# Patient Record
Sex: MALE | NOT HISPANIC OR LATINO | Employment: UNEMPLOYED | URBAN - METROPOLITAN AREA
[De-identification: names, ages, dates, MRNs, and addresses within clinical notes are randomized per-mention and may not be internally consistent; named-entity substitution may affect disease eponyms.]

---

## 2017-05-18 ENCOUNTER — APPOINTMENT (OUTPATIENT)
Dept: AUDIOLOGY | Age: 52
End: 2017-05-18
Payer: MEDICARE

## 2017-05-18 PROCEDURE — 92567 TYMPANOMETRY: CPT | Performed by: AUDIOLOGIST

## 2017-06-15 ENCOUNTER — APPOINTMENT (OUTPATIENT)
Dept: AUDIOLOGY | Age: 52
End: 2017-06-15
Payer: MEDICARE

## 2017-06-15 PROCEDURE — 92567 TYMPANOMETRY: CPT | Performed by: AUDIOLOGIST

## 2017-08-07 ENCOUNTER — APPOINTMENT (OUTPATIENT)
Dept: AUDIOLOGY | Age: 52
End: 2017-08-07
Payer: MEDICARE

## 2017-08-07 PROCEDURE — 92557 COMPREHENSIVE HEARING TEST: CPT | Performed by: AUDIOLOGIST

## 2017-08-07 PROCEDURE — 92567 TYMPANOMETRY: CPT | Performed by: AUDIOLOGIST

## 2025-03-13 ENCOUNTER — APPOINTMENT (INPATIENT)
Dept: RADIOLOGY | Facility: HOSPITAL | Age: 60
DRG: 299 | End: 2025-03-13
Payer: MEDICARE

## 2025-03-13 ENCOUNTER — APPOINTMENT (EMERGENCY)
Dept: RADIOLOGY | Facility: HOSPITAL | Age: 60
DRG: 299 | End: 2025-03-13
Payer: MEDICARE

## 2025-03-13 ENCOUNTER — APPOINTMENT (INPATIENT)
Dept: NON INVASIVE DIAGNOSTICS | Facility: HOSPITAL | Age: 60
DRG: 299 | End: 2025-03-13
Payer: MEDICARE

## 2025-03-13 ENCOUNTER — HOSPITAL ENCOUNTER (INPATIENT)
Facility: HOSPITAL | Age: 60
LOS: 6 days | Discharge: HOME | DRG: 299 | End: 2025-03-19
Attending: EMERGENCY MEDICINE | Admitting: INTERNAL MEDICINE
Payer: MEDICARE

## 2025-03-13 DIAGNOSIS — E27.40 ADRENAL INSUFFICIENCY (HCC): ICD-10-CM

## 2025-03-13 DIAGNOSIS — L98.429 SKIN ULCER OF SACRUM, UNSPECIFIED ULCER STAGE (HCC): ICD-10-CM

## 2025-03-13 DIAGNOSIS — I82.90 ACUTE VENOUS THROMBOSIS: Primary | ICD-10-CM

## 2025-03-13 DIAGNOSIS — E87.6 HYPOKALEMIA: ICD-10-CM

## 2025-03-13 DIAGNOSIS — R41.82 ALTERED MENTAL STATUS: ICD-10-CM

## 2025-03-13 DIAGNOSIS — I95.9 HYPOTENSION: ICD-10-CM

## 2025-03-13 PROBLEM — E03.9 HYPOTHYROIDISM: Status: ACTIVE | Noted: 2025-03-13

## 2025-03-13 PROBLEM — Q90.9 DOWN SYNDROME: Status: ACTIVE | Noted: 2025-03-13

## 2025-03-13 PROBLEM — F41.9 ANXIETY: Status: ACTIVE | Noted: 2025-03-13

## 2025-03-13 LAB
ALBUMIN SERPL BCG-MCNC: 2.6 G/DL (ref 3.5–5)
ALP SERPL-CCNC: 67 U/L (ref 34–104)
ALT SERPL W P-5'-P-CCNC: 35 U/L (ref 7–52)
AMORPH URATE CRY URNS QL MICRO: NORMAL
ANION GAP SERPL CALCULATED.3IONS-SCNC: 9 MMOL/L (ref 4–13)
APTT PPP: 27 SECONDS (ref 23–34)
APTT PPP: 96 SECONDS (ref 23–34)
AST SERPL W P-5'-P-CCNC: 25 U/L (ref 13–39)
ATRIAL RATE: 84 BPM
BACTERIA UR QL AUTO: NORMAL /HPF
BASOPHILS # BLD AUTO: 0.03 THOUSANDS/ÂΜL (ref 0–0.1)
BASOPHILS NFR BLD AUTO: 1 % (ref 0–1)
BILIRUB SERPL-MCNC: 0.55 MG/DL (ref 0.2–1)
BILIRUB UR QL STRIP: NEGATIVE
BUN SERPL-MCNC: 10 MG/DL (ref 5–25)
CALCIUM ALBUM COR SERPL-MCNC: 9.5 MG/DL (ref 8.3–10.1)
CALCIUM SERPL-MCNC: 8.4 MG/DL (ref 8.4–10.2)
CHLORIDE SERPL-SCNC: 103 MMOL/L (ref 96–108)
CLARITY UR: CLEAR
CO2 SERPL-SCNC: 30 MMOL/L (ref 21–32)
COLOR UR: ABNORMAL
CREAT SERPL-MCNC: 0.64 MG/DL (ref 0.6–1.3)
EOSINOPHIL # BLD AUTO: 0.05 THOUSAND/ÂΜL (ref 0–0.61)
EOSINOPHIL NFR BLD AUTO: 1 % (ref 0–6)
ERYTHROCYTE [DISTWIDTH] IN BLOOD BY AUTOMATED COUNT: 19.5 % (ref 11.6–15.1)
GFR SERPL CREATININE-BSD FRML MDRD: 105 ML/MIN/1.73SQ M
GLUCOSE SERPL-MCNC: 99 MG/DL (ref 65–140)
GLUCOSE UR STRIP-MCNC: NEGATIVE MG/DL
HCT VFR BLD AUTO: 37.4 % (ref 36.5–49.3)
HGB BLD-MCNC: 11.7 G/DL (ref 12–17)
HGB UR QL STRIP.AUTO: NEGATIVE
IMM GRANULOCYTES # BLD AUTO: 0.04 THOUSAND/UL (ref 0–0.2)
IMM GRANULOCYTES NFR BLD AUTO: 1 % (ref 0–2)
INR PPP: 1.09 (ref 0.85–1.19)
KETONES UR STRIP-MCNC: NEGATIVE MG/DL
LACTATE SERPL-SCNC: 0.9 MMOL/L (ref 0.5–2)
LACTATE SERPL-SCNC: 2.3 MMOL/L (ref 0.5–2)
LEUKOCYTE ESTERASE UR QL STRIP: NEGATIVE
LYMPHOCYTES # BLD AUTO: 1.55 THOUSANDS/ÂΜL (ref 0.6–4.47)
LYMPHOCYTES NFR BLD AUTO: 31 % (ref 14–44)
MCH RBC QN AUTO: 31 PG (ref 26.8–34.3)
MCHC RBC AUTO-ENTMCNC: 31.3 G/DL (ref 31.4–37.4)
MCV RBC AUTO: 99 FL (ref 82–98)
MONOCYTES # BLD AUTO: 0.64 THOUSAND/ÂΜL (ref 0.17–1.22)
MONOCYTES NFR BLD AUTO: 13 % (ref 4–12)
MUCOUS THREADS UR QL AUTO: NORMAL
NEUTROPHILS # BLD AUTO: 2.7 THOUSANDS/ÂΜL (ref 1.85–7.62)
NEUTS SEG NFR BLD AUTO: 53 % (ref 43–75)
NITRITE UR QL STRIP: NEGATIVE
NON-SQ EPI CELLS URNS QL MICRO: NORMAL /HPF
NRBC BLD AUTO-RTO: 0 /100 WBCS
P AXIS: 59 DEGREES
PH UR STRIP.AUTO: 7 [PH]
PLATELET # BLD AUTO: 330 THOUSANDS/UL (ref 149–390)
PMV BLD AUTO: 10.1 FL (ref 8.9–12.7)
POTASSIUM SERPL-SCNC: 3.5 MMOL/L (ref 3.5–5.3)
PR INTERVAL: 128 MS
PROCALCITONIN SERPL-MCNC: 0.16 NG/ML
PROT SERPL-MCNC: 7 G/DL (ref 6.4–8.4)
PROT UR STRIP-MCNC: ABNORMAL MG/DL
PROTHROMBIN TIME: 14.4 SECONDS (ref 12.3–15)
QRS AXIS: 74 DEGREES
QRSD INTERVAL: 90 MS
QT INTERVAL: 354 MS
QTC INTERVAL: 418 MS
RBC # BLD AUTO: 3.78 MILLION/UL (ref 3.88–5.62)
RBC #/AREA URNS AUTO: NORMAL /HPF
SODIUM SERPL-SCNC: 142 MMOL/L (ref 135–147)
SP GR UR STRIP.AUTO: 1.01 (ref 1–1.03)
T WAVE AXIS: 9 DEGREES
T4 FREE SERPL-MCNC: 1.33 NG/DL (ref 0.61–1.12)
TSH SERPL DL<=0.05 MIU/L-ACNC: 5.66 UIU/ML (ref 0.45–4.5)
UROBILINOGEN UR STRIP-ACNC: <2 MG/DL
VENTRICULAR RATE: 84 BPM
WBC # BLD AUTO: 5.01 THOUSAND/UL (ref 4.31–10.16)
WBC #/AREA URNS AUTO: NORMAL /HPF

## 2025-03-13 PROCEDURE — 70450 CT HEAD/BRAIN W/O DYE: CPT

## 2025-03-13 PROCEDURE — 93005 ELECTROCARDIOGRAM TRACING: CPT

## 2025-03-13 PROCEDURE — 84145 PROCALCITONIN (PCT): CPT

## 2025-03-13 PROCEDURE — 96375 TX/PRO/DX INJ NEW DRUG ADDON: CPT

## 2025-03-13 PROCEDURE — 81001 URINALYSIS AUTO W/SCOPE: CPT

## 2025-03-13 PROCEDURE — 96366 THER/PROPH/DIAG IV INF ADDON: CPT

## 2025-03-13 PROCEDURE — 71260 CT THORAX DX C+: CPT

## 2025-03-13 PROCEDURE — 96374 THER/PROPH/DIAG INJ IV PUSH: CPT

## 2025-03-13 PROCEDURE — 84439 ASSAY OF FREE THYROXINE: CPT

## 2025-03-13 PROCEDURE — 99223 1ST HOSP IP/OBS HIGH 75: CPT | Performed by: INTERNAL MEDICINE

## 2025-03-13 PROCEDURE — 96365 THER/PROPH/DIAG IV INF INIT: CPT

## 2025-03-13 PROCEDURE — 85025 COMPLETE CBC W/AUTO DIFF WBC: CPT

## 2025-03-13 PROCEDURE — 93010 ELECTROCARDIOGRAM REPORT: CPT | Performed by: INTERNAL MEDICINE

## 2025-03-13 PROCEDURE — 93970 EXTREMITY STUDY: CPT | Performed by: SURGERY

## 2025-03-13 PROCEDURE — 87040 BLOOD CULTURE FOR BACTERIA: CPT

## 2025-03-13 PROCEDURE — 96368 THER/DIAG CONCURRENT INF: CPT

## 2025-03-13 PROCEDURE — 93970 EXTREMITY STUDY: CPT

## 2025-03-13 PROCEDURE — 99449 NTRPROF PH1/NTRNET/EHR 31/>: CPT | Performed by: NURSE PRACTITIONER

## 2025-03-13 PROCEDURE — 83605 ASSAY OF LACTIC ACID: CPT

## 2025-03-13 PROCEDURE — 84443 ASSAY THYROID STIM HORMONE: CPT

## 2025-03-13 PROCEDURE — 85610 PROTHROMBIN TIME: CPT

## 2025-03-13 PROCEDURE — 96367 TX/PROPH/DG ADDL SEQ IV INF: CPT

## 2025-03-13 PROCEDURE — 36415 COLL VENOUS BLD VENIPUNCTURE: CPT

## 2025-03-13 PROCEDURE — 74177 CT ABD & PELVIS W/CONTRAST: CPT

## 2025-03-13 PROCEDURE — 71275 CT ANGIOGRAPHY CHEST: CPT

## 2025-03-13 PROCEDURE — 99291 CRITICAL CARE FIRST HOUR: CPT | Performed by: EMERGENCY MEDICINE

## 2025-03-13 PROCEDURE — 85730 THROMBOPLASTIN TIME PARTIAL: CPT

## 2025-03-13 PROCEDURE — 71045 X-RAY EXAM CHEST 1 VIEW: CPT

## 2025-03-13 PROCEDURE — 99285 EMERGENCY DEPT VISIT HI MDM: CPT

## 2025-03-13 PROCEDURE — 80053 COMPREHEN METABOLIC PANEL: CPT

## 2025-03-13 PROCEDURE — 96361 HYDRATE IV INFUSION ADD-ON: CPT

## 2025-03-13 RX ORDER — LEVOTHYROXINE SODIUM 88 UG/1
88 TABLET ORAL
Status: DISCONTINUED | OUTPATIENT
Start: 2025-03-14 | End: 2025-03-14

## 2025-03-13 RX ORDER — HEPARIN SODIUM 10000 [USP'U]/100ML
3-30 INJECTION, SOLUTION INTRAVENOUS
Status: DISCONTINUED | OUTPATIENT
Start: 2025-03-13 | End: 2025-03-15

## 2025-03-13 RX ORDER — MAGNESIUM HYDROXIDE/ALUMINUM HYDROXICE/SIMETHICONE 120; 1200; 1200 MG/30ML; MG/30ML; MG/30ML
30 SUSPENSION ORAL EVERY 6 HOURS PRN
Status: DISCONTINUED | OUTPATIENT
Start: 2025-03-13 | End: 2025-03-19 | Stop reason: HOSPADM

## 2025-03-13 RX ORDER — POLYETHYLENE GLYCOL 3350 17 G/17G
17 POWDER, FOR SOLUTION ORAL DAILY
Status: DISCONTINUED | OUTPATIENT
Start: 2025-03-13 | End: 2025-03-19 | Stop reason: HOSPADM

## 2025-03-13 RX ORDER — BENZOCAINE/MENTHOL 6 MG-10 MG
LOZENGE MUCOUS MEMBRANE 2 TIMES DAILY
Status: DISCONTINUED | OUTPATIENT
Start: 2025-03-13 | End: 2025-03-19 | Stop reason: HOSPADM

## 2025-03-13 RX ORDER — SODIUM CHLORIDE, SODIUM GLUCONATE, SODIUM ACETATE, POTASSIUM CHLORIDE, MAGNESIUM CHLORIDE, SODIUM PHOSPHATE, DIBASIC, AND POTASSIUM PHOSPHATE .53; .5; .37; .037; .03; .012; .00082 G/100ML; G/100ML; G/100ML; G/100ML; G/100ML; G/100ML; G/100ML
1000 INJECTION, SOLUTION INTRAVENOUS ONCE
Status: COMPLETED | OUTPATIENT
Start: 2025-03-13 | End: 2025-03-13

## 2025-03-13 RX ORDER — SODIUM CHLORIDE 9 MG/ML
100 INJECTION, SOLUTION INTRAVENOUS CONTINUOUS
Status: DISPENSED | OUTPATIENT
Start: 2025-03-13 | End: 2025-03-14

## 2025-03-13 RX ORDER — GUAIFENESIN 200 MG/10ML
LIQUID ORAL 3 TIMES DAILY PRN
Status: DISCONTINUED | OUTPATIENT
Start: 2025-03-13 | End: 2025-03-19 | Stop reason: HOSPADM

## 2025-03-13 RX ORDER — ATORVASTATIN CALCIUM 20 MG/1
20 TABLET, FILM COATED ORAL DAILY
Status: DISCONTINUED | OUTPATIENT
Start: 2025-03-13 | End: 2025-03-19 | Stop reason: HOSPADM

## 2025-03-13 RX ORDER — FAMOTIDINE 20 MG/1
20 TABLET, FILM COATED ORAL 2 TIMES DAILY
Status: DISCONTINUED | OUTPATIENT
Start: 2025-03-13 | End: 2025-03-19 | Stop reason: HOSPADM

## 2025-03-13 RX ORDER — HYDROCORTISONE SODIUM SUCCINATE 100 MG/2ML
50 INJECTION INTRAMUSCULAR; INTRAVENOUS EVERY 6 HOURS
Status: DISCONTINUED | OUTPATIENT
Start: 2025-03-13 | End: 2025-03-15

## 2025-03-13 RX ORDER — ACETAMINOPHEN 325 MG/1
650 TABLET ORAL EVERY 6 HOURS PRN
Status: DISCONTINUED | OUTPATIENT
Start: 2025-03-13 | End: 2025-03-19 | Stop reason: HOSPADM

## 2025-03-13 RX ORDER — ONDANSETRON 2 MG/ML
4 INJECTION INTRAMUSCULAR; INTRAVENOUS EVERY 6 HOURS PRN
Status: DISCONTINUED | OUTPATIENT
Start: 2025-03-13 | End: 2025-03-19 | Stop reason: HOSPADM

## 2025-03-13 RX ORDER — PANTOPRAZOLE SODIUM 20 MG/1
20 TABLET, DELAYED RELEASE ORAL
Status: DISCONTINUED | OUTPATIENT
Start: 2025-03-14 | End: 2025-03-19 | Stop reason: HOSPADM

## 2025-03-13 RX ORDER — MIRTAZAPINE 15 MG/1
15 TABLET, FILM COATED ORAL
Status: DISCONTINUED | OUTPATIENT
Start: 2025-03-13 | End: 2025-03-19 | Stop reason: HOSPADM

## 2025-03-13 RX ORDER — HEPARIN SODIUM 1000 [USP'U]/ML
5200 INJECTION, SOLUTION INTRAVENOUS; SUBCUTANEOUS EVERY 6 HOURS PRN
Status: DISCONTINUED | OUTPATIENT
Start: 2025-03-13 | End: 2025-03-15

## 2025-03-13 RX ORDER — HYDROCORTISONE SODIUM SUCCINATE 100 MG/2ML
100 INJECTION INTRAMUSCULAR; INTRAVENOUS ONCE
Status: COMPLETED | OUTPATIENT
Start: 2025-03-13 | End: 2025-03-13

## 2025-03-13 RX ORDER — HEPARIN SODIUM 1000 [USP'U]/ML
2600 INJECTION, SOLUTION INTRAVENOUS; SUBCUTANEOUS EVERY 6 HOURS PRN
Status: DISCONTINUED | OUTPATIENT
Start: 2025-03-13 | End: 2025-03-15

## 2025-03-13 RX ORDER — HEPARIN SODIUM 1000 [USP'U]/ML
5200 INJECTION, SOLUTION INTRAVENOUS; SUBCUTANEOUS ONCE
Status: COMPLETED | OUTPATIENT
Start: 2025-03-13 | End: 2025-03-13

## 2025-03-13 RX ADMIN — FAMOTIDINE 20 MG: 20 TABLET, FILM COATED ORAL at 17:07

## 2025-03-13 RX ADMIN — HYDROCORTISONE SODIUM SUCCINATE 50 MG: 100 INJECTION, POWDER, FOR SOLUTION INTRAMUSCULAR; INTRAVENOUS at 14:54

## 2025-03-13 RX ADMIN — SODIUM CHLORIDE, SODIUM GLUCONATE, SODIUM ACETATE, POTASSIUM CHLORIDE, MAGNESIUM CHLORIDE, SODIUM PHOSPHATE, DIBASIC, AND POTASSIUM PHOSPHATE 1000 ML: .53; .5; .37; .037; .03; .012; .00082 INJECTION, SOLUTION INTRAVENOUS at 07:47

## 2025-03-13 RX ADMIN — VANCOMYCIN HYDROCHLORIDE 1500 MG: 1 INJECTION, POWDER, LYOPHILIZED, FOR SOLUTION INTRAVENOUS at 10:36

## 2025-03-13 RX ADMIN — MIRTAZAPINE 15 MG: 15 TABLET, FILM COATED ORAL at 21:29

## 2025-03-13 RX ADMIN — AZITHROMYCIN MONOHYDRATE 500 MG: 500 INJECTION, POWDER, LYOPHILIZED, FOR SOLUTION INTRAVENOUS at 09:11

## 2025-03-13 RX ADMIN — HYDROCORTISONE SODIUM SUCCINATE 50 MG: 100 INJECTION, POWDER, FOR SOLUTION INTRAMUSCULAR; INTRAVENOUS at 20:42

## 2025-03-13 RX ADMIN — SODIUM CHLORIDE 100 ML/HR: 0.9 INJECTION, SOLUTION INTRAVENOUS at 14:53

## 2025-03-13 RX ADMIN — HYDROCORTISONE SODIUM SUCCINATE 100 MG: 100 INJECTION, POWDER, FOR SOLUTION INTRAMUSCULAR; INTRAVENOUS at 09:16

## 2025-03-13 RX ADMIN — POLYETHYLENE GLYCOL 3350 17 G: 17 POWDER, FOR SOLUTION ORAL at 14:52

## 2025-03-13 RX ADMIN — IOHEXOL 100 ML: 350 INJECTION, SOLUTION INTRAVENOUS at 12:21

## 2025-03-13 RX ADMIN — CEFEPIME 2000 MG: 2 INJECTION, POWDER, FOR SOLUTION INTRAVENOUS at 08:03

## 2025-03-13 RX ADMIN — HEPARIN SODIUM 18 UNITS/KG/HR: 10000 INJECTION, SOLUTION INTRAVENOUS at 13:51

## 2025-03-13 RX ADMIN — SODIUM CHLORIDE 1000 ML: 0.9 INJECTION, SOLUTION INTRAVENOUS at 07:28

## 2025-03-13 RX ADMIN — HEPARIN SODIUM 5200 UNITS: 1000 INJECTION INTRAVENOUS; SUBCUTANEOUS at 13:51

## 2025-03-13 RX ADMIN — IOHEXOL 85 ML: 350 INJECTION, SOLUTION INTRAVENOUS at 14:38

## 2025-03-13 NOTE — ASSESSMENT & PLAN NOTE
TSH 5.657  Continue levothyroxine 88 mcg p.o. daily per [facility] records  Recently [facility] hospitalization notes reviewed, patient was placed on levothyroxine 100 mcg  Monitor TSH in 4 to 6 weeks and outpatient follow-up

## 2025-03-13 NOTE — ED PROVIDER NOTES
Time reflects when diagnosis was documented in both MDM as applicable and the Disposition within this note       Time User Action Codes Description Comment    3/13/2025  2:04 PM Keiry Carlos Add [I82.90] Acute extensive venous thrombosis     3/13/2025  3:44 PM Carlos Torres Add [R41.82] Altered mental status     3/13/2025  3:44 PM Carlos Torres Add [I95.9] Hypotension           ED Disposition       ED Disposition   Admit    Condition   Stable    Date/Time   Thu Mar 13, 2025  1:46 PM    Comment   Case was discussed with Breanna and the patient's admission status was agreed to be Admission Status: inpatient status to the service of Dr. Carlos .               Assessment & Plan       Medical Decision Making  59-year-old male, recent hospitalization for community-acquired pneumonia associated with hypotension, sepsis/SIRS, presenting to the emergency department for evaluation of altered mental status.  Patient noted to be alert and intermittently to painful stimuli and intermittently to verbal stimuli.  Patient with borderline blood pressure.  Capillary refill is approximately 5 seconds.      Critical Care Time Statement: Upon my evaluation, this patient had a high probability of imminent or life-threatening deterioration due to hypotension, which required my direct attention, intervention, and personal management.  I spent a total of 46 minutes directly providing critical care services, including interpretation of complex medical databases, evaluating for the presence of life-threatening injuries or illnesses, management of organ system failure(s) , complex medical decision making (to support/prevent further life-threatening deterioration)., interpretation of hemodynamic data, and titration of continuous IV medications (drips). This time is exclusive of procedures, teaching, treating other patients, family meetings, and any prior time recorded by providers other than myself.       Amount and/or  Complexity of Data Reviewed  Labs: ordered. Decision-making details documented in ED Course.  Radiology: ordered and independent interpretation performed.    Risk  Prescription drug management.  Decision regarding hospitalization.        ED Course as of 03/13/25 1603   Thu Mar 13, 2025   0935 Comprehensive metabolic panel(!)  Reviewed   0935 Lactic acid(!)  Elevated   0936 Urine Microscopic  Normal   0936 CBC and differential(!)  Reviewed   0936 TSH, 3rd generation with Free T4 reflex(!)  Elevated       Medications   heparin (porcine) 25,000 units in 0.45% NaCl 250 mL infusion (premix) ( Intravenous Not Given 3/13/25 1354)   heparin (porcine) injection 5,200 Units (has no administration in time range)   heparin (porcine) injection 2,600 Units (has no administration in time range)   atorvastatin (LIPITOR) tablet 20 mg (20 mg Oral Not Given 3/13/25 1452)   famotidine (PEPCID) tablet 20 mg (has no administration in time range)   hydrocortisone 1 % cream (has no administration in time range)   levothyroxine tablet 88 mcg (has no administration in time range)   mirtazapine (REMERON) tablet 15 mg (has no administration in time range)   pantoprazole (PROTONIX) EC tablet 20 mg (has no administration in time range)   white petrolatum-mineral oil (EUCERIN,HYDROCERIN) cream (has no administration in time range)   sodium chloride 0.9 % infusion (100 mL/hr Intravenous New Bag 3/13/25 1453)   acetaminophen (TYLENOL) tablet 650 mg (has no administration in time range)   polyethylene glycol (MIRALAX) packet 17 g (17 g Oral Given 3/13/25 1452)   aluminum-magnesium hydroxide-simethicone (MAALOX) oral suspension 30 mL (has no administration in time range)   ondansetron (ZOFRAN) injection 4 mg (has no administration in time range)   hydrocortisone (Solu-CORTEF) injection 50 mg (50 mg Intravenous Given 3/13/25 1454)   sodium chloride 0.9 % bolus 1,000 mL (0 mL Intravenous Stopped 3/13/25 0940)   multi-electrolyte (Plasmalyte-A/Isolyte-S  PH 7.4/Normosol-R) IV bolus 1,000 mL (0 mL Intravenous Stopped 3/13/25 0847)   cefepime (MAXIPIME) 2 g/50 mL dextrose IVPB (0 mg Intravenous Stopped 3/13/25 0833)   azithromycin (ZITHROMAX) 500 mg in sodium chloride 0.9% 250mL IVPB 500 mg (0 mg Intravenous Stopped 3/13/25 1011)   vancomycin (VANCOCIN) 1500 mg in sodium chloride 0.9% 250 mL IVPB (0 mg Intravenous Stopped 3/13/25 1351)   hydrocortisone (Solu-CORTEF) injection 100 mg (100 mg Intravenous Given 3/13/25 0916)   iohexol (OMNIPAQUE) 350 MG/ML injection (MULTI-DOSE) 100 mL (100 mL Intravenous Given 3/13/25 1221)   heparin (porcine) injection 5,200 Units (5,200 Units Intravenous Given 3/13/25 1351)   iohexol (OMNIPAQUE) 350 MG/ML injection (MULTI-DOSE) 85 mL (85 mL Intravenous Given 3/13/25 1438)       ED Risk Strat Scores                            SBIRT 20yo+      Flowsheet Row Most Recent Value   Initial Alcohol Screen: US AUDIT-C     1. How often do you have a drink containing alcohol? 0 Filed at: 03/13/2025 0746   2. How many drinks containing alcohol do you have on a typical day you are drinking?  0 Filed at: 03/13/2025 0746   3a. Male UNDER 65: How often do you have five or more drinks on one occasion? 0 Filed at: 03/13/2025 0746   Audit-C Score 0 Filed at: 03/13/2025 0746   DORA: How many times in the past year have you...    Used an illegal drug or used a prescription medication for non-medical reasons? Never Filed at: 03/13/2025 0746                            History of Present Illness       Chief Complaint   Patient presents with    Altered Mental Status     Pt from [facility]; EMS called d/t staff stating pts sats were 88-90% on RA. Per EMS sats were 96% on RA. Staff states pt also not acting baseline       Past Medical History:   Diagnosis Date    Alzheimer disease (HCC)     Blepharitis     Diverticulitis     Down syndrome     GERD (gastroesophageal reflux disease)     Hyperlipemia     Hypotension     Hypothyroidism     Osteoarthritis       Past  Surgical History:   Procedure Laterality Date    COLONOSCOPY      VASECTOMY        No family history on file.   Social History     Tobacco Use    Smoking status: Never    Smokeless tobacco: Never   Vaping Use    Vaping status: Never Used   Substance Use Topics    Alcohol use: Never      E-Cigarette/Vaping    E-Cigarette Use Never User       E-Cigarette/Vaping Substances    Nicotine No     THC No     CBD No     Flavoring No     Other No     Unknown No       I have reviewed and agree with the history as documented.     History on this patient is limited to chart review secondary to the patient being nonverbal and staff at acute care facility where the patient comes from state that they have only had him as a resident for the past 1 day.  By review of the chart, patient was recently admitted to [facility] on March 3 for pneumonia associated with sepsis.  While the patient was hospitalized he was treated with 5 days of cefepime, vancomycin, and azithromycin as well as stress dose steroids for reported hypotension, presenting to the emergency department today for altered mental status.  Per staff member who is at the bedside, patient was thought to be at his baseline yesterday where he was alert and feeding.  This morning the patient is somnolent but arousable with physical stimulation.        Review of Systems   Unable to perform ROS: Mental status change           Objective       ED Triage Vitals   Temperature Pulse Blood Pressure Respirations SpO2 Patient Position - Orthostatic VS   03/13/25 0622 03/13/25 0620 03/13/25 0620 03/13/25 0620 03/13/25 0620 03/13/25 0620   97.9 °F (36.6 °C) 87 93/60 21 96 % Lying      Temp Source Heart Rate Source BP Location FiO2 (%) Pain Score    03/13/25 0622 03/13/25 0620 03/13/25 0620 -- --    Axillary Monitor Right arm        Vitals      Date and Time Temp Pulse SpO2 Resp BP Pain Score FACES Pain Rating User   03/13/25 1530 -- 86 94 % 16 104/60 -- -- AM    03/13/25 1515 -- 94 95 % 17 107/65 -- -- AM   03/13/25 1500 -- 94 96 % 19 109/62 -- -- AM   03/13/25 1445 -- 98 96 % 32 121/75 -- -- AM   03/13/25 1415 -- 94 97 % 21 115/77 -- -- AM   03/13/25 1400 -- 94 96 % 17 105/64 -- -- AM   03/13/25 1345 -- 84 93 % 19 109/65 -- -- AM   03/13/25 1315 -- 84 92 % 19 104/60 -- -- JK   03/13/25 1300 -- 78 95 % 16 90/54 -- -- AM   03/13/25 1245 -- 82 91 % 20 90/52 -- -- AM   03/13/25 1230 -- 94 95 % 22 108/55 -- -- AM   03/13/25 1100 -- 80 95 % 14 98/58 -- -- AM   03/13/25 1045 -- 76 91 % 17 99/57 -- -- AM   03/13/25 1030 -- 78 94 % 19 88/56 -- -- AM   03/13/25 1015 -- 80 96 % 17 97/58 -- -- AM   03/13/25 1000 -- 74 95 % 16 90/54 -- -- AM   03/13/25 0945 -- 74 95 % 15 85/52 -- -- AM   03/13/25 0930 -- 82 96 % 19 96/60 -- -- AM   03/13/25 0915 -- 86 98 % 20 98/61 -- -- AM   03/13/25 0900 -- 78 97 % 20 97/60 -- -- AM   03/13/25 0845 -- 80 98 % 19 84/52 -- -- AM   03/13/25 0830 -- 70 94 % 17 81/47 -- -- AM   03/13/25 0815 -- 78 97 % 16 103/66 -- -- AM   03/13/25 0622 97.9 °F (36.6 °C) -- -- -- -- -- -- PT   03/13/25 0620 -- 87 96 % 21 93/60 -- -- PT            Physical Exam  Constitutional:       Appearance: He is ill-appearing and toxic-appearing.   HENT:      Head: Normocephalic and atraumatic.      Right Ear: External ear normal.      Left Ear: External ear normal.      Nose: Nose normal.      Mouth/Throat:      Mouth: Mucous membranes are dry.      Comments: Mucosal membranes are dry.  Eyes:      Conjunctiva/sclera: Conjunctivae normal.      Pupils: Pupils are equal, round, and reactive to light.   Cardiovascular:      Rate and Rhythm: Normal rate and regular rhythm.   Pulmonary:      Effort: Pulmonary effort is normal.      Breath sounds: Rhonchi present.   Abdominal:      General: Abdomen is flat.      Tenderness: There is no abdominal tenderness.   Genitourinary:     Penis: Normal.       Testes: Normal.   Musculoskeletal:         General: Normal range of motion.      Cervical  back: Normal range of motion and neck supple.   Skin:     General: Skin is warm.      Capillary Refill: Capillary refill takes more than 3 seconds.   Neurological:      GCS: GCS eye subscore is 3. GCS verbal subscore is 2. GCS motor subscore is 5.      Cranial Nerves: Cranial nerves 2-12 are intact.      Sensory: Sensation is intact.      Motor: Motor function is intact.         Results Reviewed       Procedure Component Value Units Date/Time    Lactic acid 2 Hours [[id number]]  (Normal) Collected: 03/13/25 1019    Lab Status: Final result Specimen: Blood from Line, Venous Updated: 03/13/25 1048     LACTIC ACID 0.9 mmol/L     Narrative:      Result may be elevated if tourniquet was used during collection.    Urine Microscopic [[id number]] Collected: 03/13/25 0743    Lab Status: Final result Specimen: Urine, Straight Cath Updated: 03/13/25 0924     RBC, UA None Seen /hpf      WBC, UA None Seen /hpf      Epithelial Cells None Seen /hpf      Bacteria, UA None Seen /hpf      MUCUS THREADS None Seen     Amorphous Crystals, UA Occasional    UA w Reflex to Microscopic w Reflex to Culture [[id number]]  (Abnormal) Collected: 03/13/25 0743    Lab Status: Final result Specimen: Urine, Straight Cath Updated: 03/13/25 0839     Color, UA Light Yellow     Clarity, UA Clear     Specific Gravity, UA 1.009     pH, UA 7.0     Leukocytes, UA Negative     Nitrite, UA Negative     Protein, UA Trace mg/dl      Glucose, UA Negative mg/dl      Ketones, UA Negative mg/dl      Urobilinogen, UA <2.0 mg/dl      Bilirubin, UA Negative     Occult Blood, UA Negative    TSH, 3rd generation with Free T4 reflex [[id number]]  (Abnormal) Collected: 03/13/25 0721    Lab Status: Final result Specimen: Blood from Hand, Left Updated: 03/13/25 0838     TSH 3RD GENERATON 5.657 uIU/mL     T4, free [[id number]] Collected: 03/13/25 0721    Lab Status: In process Specimen: Blood from Hand, Left Updated: 03/13/25 0838    Procalcitonin [[id number]]  (Normal) Collected:  03/13/25 0723    Lab Status: Final result Specimen: Blood from Arm, Right Updated: 03/13/25 0833     Procalcitonin 0.16 ng/ml     Comprehensive metabolic panel [[id number]]  (Abnormal) Collected: 03/13/25 0723    Lab Status: Final result Specimen: Blood from Arm, Right Updated: 03/13/25 0831     Sodium 142 mmol/L      Potassium 3.5 mmol/L      Chloride 103 mmol/L      CO2 30 mmol/L      ANION GAP 9 mmol/L      BUN 10 mg/dL      Creatinine 0.64 mg/dL      Glucose 99 mg/dL      Calcium 8.4 mg/dL      Corrected Calcium 9.5 mg/dL      AST 25 U/L      ALT 35 U/L      Alkaline Phosphatase 67 U/L      Total Protein 7.0 g/dL      Albumin 2.6 g/dL      Total Bilirubin 0.55 mg/dL      eGFR 105 ml/min/1.73sq m     Narrative:      National Kid[employer] guidelines for Chronic Kidney Disease (CKD):     Stage 1 with normal or high GFR (GFR > 90 mL/min/1.73 square meters)    Stage 2 Mild CKD (GFR = 60-89 mL/min/1.73 square meters)    Stage 3A Moderate CKD (GFR = 45-59 mL/min/1.73 square meters)    Stage 3B Moderate CKD (GFR = 30-44 mL/min/1.73 square meters)    Stage 4 Severe CKD (GFR = 15-29 mL/min/1.73 square meters)    Stage 5 End Stage CKD (GFR <15 mL/min/1.73 square meters)  Note: GFR calculation is accurate only with a steady state creatinine    Lactic acid [[id number]]  (Abnormal) Collected: 03/13/25 0723    Lab Status: Final result Specimen: Blood from Arm, Right Updated: 03/13/25 0830     LACTIC ACID 2.3 mmol/L     Narrative:      Result may be elevated if tourniquet was used during collection.    Protime-INR [[id number]]  (Normal) Collected: 03/13/25 0723    Lab Status: Final result Specimen: Blood from Arm, Right Updated: 03/13/25 0820     Protime 14.4 seconds      INR 1.09    Narrative:      INR Therapeutic Range    Indication                                             INR Range      Atrial Fibrillation                                               2.0-3.0  Hypercoagulable State                                     2.0.2.3  Left Ventricular Asist Device                            2.0-3.0  Mechanical Heart Valve                                  -    Aortic(with afib, MI, embolism, HF, LA enlargement,    and/or coagulopathy)                                     2.0-3.0 (2.5-3.5)     Mitral                                                             2.5-3.5  Prosthetic/Bioprosthetic Heart Valve               2.0-3.0  Venous thromboembolism (VTE: VT, PE        2.0-3.0    APTT [[id number]]  (Normal) Collected: 03/13/25 0723    Lab Status: Final result Specimen: Blood from Arm, Right Updated: 03/13/25 0820     PTT 27 seconds     CBC and differential [[id number]]  (Abnormal) Collected: 03/13/25 0721    Lab Status: Final result Specimen: Blood from Hand, Left Updated: 03/13/25 0805     WBC 5.01 Thousand/uL      RBC 3.78 Million/uL      Hemoglobin 11.7 g/dL      Hematocrit 37.4 %      MCV 99 fL      MCH 31.0 pg      MCHC 31.3 g/dL      RDW 19.5 %      MPV 10.1 fL      Platelets 330 Thousands/uL      nRBC 0 /100 WBCs      Segmented % 53 %      Immature Grans % 1 %      Lymphocytes % 31 %      Monocytes % 13 %      Eosinophils Relative 1 %      Basophils Relative 1 %      Absolute Neutrophils 2.70 Thousands/µL      Absolute Immature Grans 0.04 Thousand/uL      Absolute Lymphocytes 1.55 Thousands/µL      Absolute Monocytes 0.64 Thousand/µL      Eosinophils Absolute 0.05 Thousand/µL      Basophils Absolute 0.03 Thousands/µL     Blood culture #1 [[id number]] Collected: 03/13/25 0736    Lab Status: In process Specimen: Blood from Hand, Right Updated: 03/13/25 0759    Blood culture #2 [[id number]] Collected: 03/13/25 0723    Lab Status: In process Specimen: Blood from Arm, Right Updated: 03/13/25 0758            CTA chest pe study   Final Interpretation by Amrit Oseguera MD (03/13 1511)      1.  No evidence of central, lobar, or segmental PE.   2.  Bronchial wall thickening and endobronchial debris in the bilateral lower lobes, which can be  seen in setting of bronchitis.   3.  Small bilateral pleural effusions.         Workstation performed: [id number]         CT chest abdomen pelvis w contrast   Final Interpretation by Sánchez Jimenez MD (03/13 0948)      Study limited by respiratory motion artifact. Small bilateral pleural effusions with mild bibasilar compressive atelectasis.      Extensive deep venous thrombosis in the inferior vena cava, bilateral common and external iliac veins, and visualized left common femoral vein.      Right nephrolithiasis.         I personally discussed this study with CARLOS MENDOZA on 3/13/2025 12:52 PM.               Workstation performed: [id number]         XR chest portable   ED Interpretation by Carlos Mendoza MD (03/13 0245)   No acute infiltrates.      Final Interpretation by Sánchez Jimenez MD (03/13 1314)      No acute cardiopulmonary disease.            Workstation performed: [id number]         CT head wo contrast    (Results Pending)    VAS VENOUS DUPLEX - LOWER LIMB BILATERAL    (Results Pending)       Complex Venous Access Line    Date/Time: 3/13/2025 7:43 AM    Performed by: Carlos Mendoza MD  Authorized by: Carlos Mendoza MD    Patient location:  ED  Consent:     Consent obtained:  Emergent situation  Pre-procedure details:     Hand hygiene: Hand hygiene performed prior to insertion      Sterile barrier technique: All elements of maximal sterile technique followed      Skin preparation agent: Skin preparation agent completely dried prior to procedure    Procedure details:     Complex Venous Access Line Type: US Guided Peripheral IV      Orientation:  Right    Location:  Antecubital    Catheter size:  18 gauge    Patient evaluated for contraindications to access (i.e. fistula, thrombosis, etc): Yes      Approach: percutaneous technique used      Patient position:  Flat    Ultrasound image availability:  Not saved and not obtained due to urgency    Sterile ultrasound techniques:  Sterile gel and sterile probe covers were used      Number of attempts:  1    Successful placement: yes      Landmarks identified: yes    Post-procedure details:     Post-procedure:  Dressing applied    Patient tolerance of procedure:  Tolerated well, no immediate complications  ECG 12 Lead Documentation Only    Date/Time: 3/13/2025 4:03 PM    Performed by: Carlos Torres MD  Authorized by: Carlos Torres MD    Patient location:  ED  Interpretation:     Interpretation: normal    Rate:     ECG rate assessment: normal    Rhythm:     Rhythm: sinus rhythm    Ectopy:     Ectopy: none    QRS:     QRS axis:  Normal  Conduction:     Conduction: normal    ST segments:     ST segments:  Normal  T waves:     T waves: normal        ED Medication and Procedure Management   Prior to Admission Medications   Prescriptions Last Dose Informant Patient Reported? Taking?   Levothyroxine Sodium 88 MCG CAPS   Yes No   Sig: Take by mouth   Skin Protectants, Misc. (MINERIN CREME EX)   Yes No   Sig: Apply topically   acetaminophen (TYLENOL) 500 mg tablet   Yes No   Sig: Take 500 mg by mouth every 6 (six) hours as needed for mild pain   atorvastatin (LIPITOR) 20 mg tablet   Yes No   Sig: Take 20 mg by mouth daily   celecoxib (CeleBREX) 200 mg capsule   Yes No   Sig: Take 200 mg by mouth 2 (two) times a day   famotidine (PEPCID) 20 mg tablet   Yes No   Sig: Take 20 mg by mouth 2 (two) times a day   hydrocortisone 0.5 % cream   Yes No   Sig: Apply topically 2 (two) times a day   mirtazapine (REMERON) 15 mg tablet   Yes No   Sig: Take 15 mg by mouth daily at bedtime   omeprazole (PriLOSEC) 20 mg delayed release capsule   Yes No   Sig: Take 20 mg by mouth daily      Facility-Administered Medications: None     Patient's Medications   Discharge Prescriptions    No medications on file     No discharge procedures on file.  ED SEPSIS DOCUMENTATION   Time reflects when diagnosis was documented in both MDM as applicable and the Disposition  "within this note       Time User Action Codes Description Comment    3/13/2025  2:04 PM Keiry Carlos Add [I82.90] Acute extensive venous thrombosis     3/13/2025  3:44 PM Carlos Torres Add [R41.82] Altered mental status     3/13/2025  3:44 PM Carlos Torres Add [I95.9] Hypotension             Default Flowsheet Data (Last 720 Hours)       Sepsis Reassess       Row Name 03/13/25 0921                   Repeat Volume Status and Tissue Perfusion Assessment Performed    Date of Reassessment: 03/13/25  -DJ        Time of Reassessment: 0921  -DJ        Sepsis Reassessment Note: Click \"NEXT\" below (NOT \"close\") to generate sepsis reassessment note. YES (proceed by clicking \"NEXT\")  -DJ        Repeat Volume Status and Tissue Perfusion Assessment Performed --                  User Key  (r) = Recorded By, (t) = Taken By, (c) = Cosigned By      Initials Name Provider Type     Carlos Torres MD Physician                     Carlos Torres MD  03/13/25 1541       Carlos Torres MD  03/13/25 1544       Carlos Torres MD  03/13/25 1603    "

## 2025-03-13 NOTE — QUICK NOTE
Addendum to H&P    History reviewed    Metabolic encephalopathy in the setting of extensive DVT  Optimize metabolic parameters  Avoid neurotoxins  Monitor    Breanna

## 2025-03-13 NOTE — H&P
H&P - Hospitalist   Name: Rogelio Guerra 59 y.o. male I MRN: [id number]  Unit/Bed#: ED 20 I Date of Admission: 3/13/2025   Date of Service: 3/13/2025 I Hospital Day: 0     Assessment & Plan  Acute extensive venous thrombosis  Transfer from [facility] for altered mental status.  Recently hospitalized  and discharged on 3/6/2025, he was diagnosed with sepsis and received treatment for pneumonia and completed antibiotics during his hospital stay.  Workup in the ED with CT chest abdomen pelvis revealed extensive thrombosis of the IVC, bilateral iliac and left femoral veins  Follow-up on CT PE study  Check lower extremity venous Dopplers  He has been placed on IV heparin gtt.  Continue IV heparin gtt.  Discussed with IR to evaluate for clot removal versus thrombolysis  Discussed with heme-onc via secure chat  Age-appropriate cancer screening and hypercoagulable workup on discharge recommended    Adrenal insufficiency (HCC)  History of adrenal sufficiency  Recent hospitalization notes reviewed patient on hydrocortisone 15 mg in the morning and 5 mg in the evening  Presently with hypotension we will have him on stress dose hydrocortisone 50 mg every 6 hours with plans to taper over the next 24 to 48 hours depending on hemodynamic stability    Altered mental status  Presents with altered mental status  Check CT head  Unknown baseline  Avoid neurotoxins  Optimize metabolic parameters  Supportive cares  Down syndrome  Patient with history of Down syndrome and diagnosis of early onset dementia  Supportive cares  Hypothyroidism  TSH 5.657  Continue levothyroxine 88 mcg p.o. daily per [facility] records  Recently [facility] hospitalization notes reviewed, patient was placed on levothyroxine 100 mcg  Monitor TSH in 4 to 6 weeks and outpatient follow-up  Anxiety  Continue mirtazapine  Trazodone      VTE Pharmacologic Prophylaxis: VTE Score: 8 High Risk (Score >/= 5) - Pharmacological DVT Prophylaxis Ordered: heparin  drip. Sequential Compression Devices Ordered.  Code Status: Level 1 - Full Code     Discussion with family:  Called [facility]  KAREEN at [phone] left a voice message, called [facility] and was transferred to RN no one picked a voice message was left.  Called Tatum Cooper [phone] listed in documents sent from [employer] and left a voice message.     Anticipated Length of Stay: Patient will be admitted on an inpatient basis with an anticipated length of stay of greater than 2 midnights secondary to extensive venous thrombosis for management as outlined.    History of Present Illness   Chief Complaint:     Altered mental status    Rogelio Guerra is a 59 y.o. male with a PMH of history of Down syndrome, early onset dementia, hypothyroidism, adrenal insufficiency, arthritis who presents with altered mental status.    History is obtained from review of documents sent from [facility] and on discussion with ER physician.  Patient is nonverbal    I called  Kareen unable to communicate left a voice message  Called [facility] was transferred RN no one picked, left a voice message  Also called Tatum Cooper contact listed on [facility] documents sent with the patient and left a voice message    He is recent hospital stay at [facility] reviewed in [facility]  Prior hospitalization outpatient notes reviewed in epic      Review of Systems   Unable to perform ROS: Mental status change       Historical Information   Past Medical History:   Diagnosis Date    Alzheimer disease (HCC)     Blepharitis     Diverticulitis     Down syndrome     GERD (gastroesophageal reflux disease)     Hyperlipemia     Hypotension     Hypothyroidism     Osteoarthritis      Past Surgical History:   Procedure Laterality Date    COLONOSCOPY      VASECTOMY       Social History     Tobacco Use    Smoking status: Never    Smokeless tobacco: Never   Vaping Use    Vaping status: Never Used   Substance and Sexual Activity    Alcohol use:  Never    Drug use: Not on file    Sexual activity: Not on file     E-Cigarette/Vaping    E-Cigarette Use Never User      E-Cigarette/Vaping Substances    Nicotine No     THC No     CBD No     Flavoring No     Other No     Unknown No      No family history on file.  Social History:  Marital Status: Single   Occupation:   Patient Pre-hospital Living Situation: [facility]  Patient Pre-hospital Level of Mobility: Ambulatory dysfunction  Patient Pre-hospital Diet Restrictions: no    Meds/Allergies   I have reviewed home medications using recent Epic encounter.  Prior to Admission medications    Medication Sig Start Date End Date Taking? Authorizing Provider   acetaminophen (TYLENOL) 500 mg tablet Take 500 mg by mouth every 6 (six) hours as needed for mild pain    Historical Provider, MD   atorvastatin (LIPITOR) 20 mg tablet Take 20 mg by mouth daily    Historical Provider, MD   celecoxib (CeleBREX) 200 mg capsule Take 200 mg by mouth 2 (two) times a day    Historical Provider, MD   famotidine (PEPCID) 20 mg tablet Take 20 mg by mouth 2 (two) times a day    Historical Provider, MD   hydrocortisone 0.5 % cream Apply topically 2 (two) times a day    Historical Provider, MD   Levothyroxine Sodium 88 MCG CAPS Take by mouth    Historical Provider, MD   mirtazapine (REMERON) 15 mg tablet Take 15 mg by mouth daily at bedtime    Historical Provider, MD   omeprazole (PriLOSEC) 20 mg delayed release capsule Take 20 mg by mouth daily    Historical Provider, MD   Skin Protectants, Misc. (MINERIN CREME EX) Apply topically    Historical Provider, MD     Allergies   Allergen Reactions    Naproxen Abdominal Pain     Abdominal Bleeding       Objective :  Temp:  [97.9 °F (36.6 °C)] 97.9 °F (36.6 °C)  HR:  [70-94] 84  BP: ()/(47-66) 109/65  Resp:  [14-22] 19  SpO2:  [91 %-98 %] 93 %  O2 Device: None (Room air)    Physical Exam         Comfortably in bed  Neck supple  Lungs diminished breath sounds  Heart sounds S1-S2 noted  Abdomen  soft nontender  Patient is nonverbal  Moves extremities to noxious stimuli  Winces to noxious stimuli  Bilateral dorsal pedal edema noted  No rash      Lines/Drains:            Lab Results: I have reviewed the following results:  Results from last 7 days   Lab Units 03/13/25  0721   WBC Thousand/uL 5.01   HEMOGLOBIN g/dL 11.7*   HEMATOCRIT % 37.4   PLATELETS Thousands/uL 330   SEGS PCT % 53   LYMPHO PCT % 31   MONO PCT % 13*   EOS PCT % 1     Results from last 7 days   Lab Units 03/13/25  0723   SODIUM mmol/L 142   POTASSIUM mmol/L 3.5   CHLORIDE mmol/L 103   CO2 mmol/L 30   BUN mg/dL 10   CREATININE mg/dL 0.64   ANION GAP mmol/L 9   CALCIUM mg/dL 8.4   ALBUMIN g/dL 2.6*   TOTAL BILIRUBIN mg/dL 0.55   ALK PHOS U/L 67   ALT U/L 35   AST U/L 25   GLUCOSE RANDOM mg/dL 99     Results from last 7 days   Lab Units 03/13/25  0723   INR  1.09         Lab Results   Component Value Date    HGBA1C 5.3 12/30/2024    HGBA1C 5.7 (H) 12/11/2023    HGBA1C 5.9 (H) 12/02/2022     Results from last 7 days   Lab Units 03/13/25  1019 03/13/25  0723   LACTIC ACID mmol/L 0.9 2.3*   PROCALCITONIN ng/ml  --  0.16       Imaging Results Review: I personally reviewed the following image studies/reports in PACS and discussed pertinent findings with Radiology: CT chest and CT abdomen/pelvis. My interpretation of the radiology images/reports is: Lab results reviewed.  Other Study Results Review: EKG was reviewed.     2D echo 1/29/2025 results reviewed EF 60 to 65%  ECG personally reviewed sinus rhythm no ST-T wave changes    ** Please Note: This note has been constructed using a voice recognition system. **

## 2025-03-13 NOTE — ASSESSMENT & PLAN NOTE
Transfer from intermediate/[facility] for altered mental status.  Recently hospitalized  and discharged on 3/6/2025, he was diagnosed with sepsis and received treatment for pneumonia and completed antibiotics during his hospital stay.  Workup in the ED with CT chest abdomen pelvis revealed extensive thrombosis of the IVC, bilateral iliac and left femoral veins  Follow-up on CT PE study  Check lower extremity venous Dopplers  He has been placed on IV heparin gtt.  Continue IV heparin gtt.  Discussed with IR to evaluate for clot removal versus thrombolysis  Discussed with heme-onc via secure chat  Age-appropriate cancer screening and hypercoagulable workup on discharge recommended

## 2025-03-13 NOTE — CONSULTS
"e-Consult (IPC)  - Interventional Radiology  Rogelio Guerra 59 y.o. male MRN: [id number]  Unit/Bed#: ED 20 Encounter: [id number]      Interventional Radiology has been consulted to evaluate Rogelio Guerra    We were consulted by internal medicine concerning this patient with extensive thrombus in IVC, iliac femoral veins.    Inpatient Consult to IR  Consult performed by: ASHKAN Kemp  Consult ordered by: Keiry Carlos MD        03/13/25    Assessment/Recommendation:     59-year-old male who presented to the emergency department with a change in mental status.    Past medical history includes Down syndrome, early onset dementia, hypothyroid, adrenal insufficiency, arthritis.  Appears patient was recently admitted and discharge from the hospital 3/6/2025 with a diagnosis of pneumonia and sepsis.    Patient is nonverbal and non-mobile.  Patient lives in [facility] home.  Emergency department reports members from [facility] with patient earlier this afternoon but unable to provide any information since patient is \"new to them\".    On evaluation in the emergency department for his change in mental status CT of the abdomen pelvis was performed.  CT of the abdomen pelvis revealed extensive thrombus of the IVC, bilateral iliac and left femoral veins.  PE study was performed with negative results for PE.  Lower extremity Dopplers pending.    IV heparin started approximately 1 PM.    Interventional radiology has been consulted for patient evaluation and possible thrombectomy versus thrombolysis.    Some medical history obtained from care everywhere and paperwork from [facility].    Visit Vitals  /66 (BP Location: Right arm)   Pulse 90   Temp 97.9 °F (36.6 °C) (Axillary)   Resp 17   Ht 4' 11\" (1.499 m)   Wt 64.9 kg (143 lb)   SpO2 96%   BMI 28.88 kg/m²   Smoking Status Never   BSA 1.6 m²      Component      Latest Ref Rng 3/13/2025   POCT INR      0.85 - 1.19  1.09      Component      Latest " Ref Rng 3/13/2025   Platelet Count      149 - 390 Thousands/uL 330      Component      Latest Ref Rng 3/13/2025   Creatinine      0.60 - 1.30 mg/dL 0.64      Component      Latest Ref Rng 3/13/2025   GFR, Calculated      ml/min/1.73sq m 105      Reviewed diagnostic imaging and laboratory findings  Discussed case with Dr. Betts and Irving  Lower extremity Dopplers pending  On physical examination patient with bilateral lower extremity edema.  This is unclear on whether this is chronic versus acute.-However previous documentation noted at [facility] on 1/16/[facility]25, patient with no lower extremity edema  Plan for mechanical thrombectomy IJ approach pending results of lower extremity Doppler results and discussion with social work/guardian from [facility]-further discussion necessary prior to performing procedure  NPO after MN   Continue heparin drip- managed by [facility]  Will need to discuss whether pt is a candidate for po AC upon discharge. If not, may require placement of IVC filter-hematology following.  Appreciate input  Remainder of care per primary care service team  Please do not hesitate to contact interventional radiology for question/concerns    31 + minutes, >50% of the total time devoted to medical consultative verbal/EMR discussion between providers. Written report will be generated in the EMR.     Thank you for allowing Interventional Radiology to participate in the care of Rogelio Guerra.     ASHKAN Kemp

## 2025-03-13 NOTE — SEPSIS NOTE
"  Sepsis Note   Rogelio Guerra 59 y.o. male MRN: [id number]  Unit/Bed#: ED 20 Encounter: [id number]          Default Flowsheet Data (Last 720 Hours)       Sepsis Reassess       Row Name 03/13/25 0921                   Repeat Volume Status and Tissue Perfusion Assessment Performed    Date of Reassessment: 03/13/25  -        Time of Reassessment: 0921  -DJ        Sepsis Reassessment Note: Click \"NEXT\" below (NOT \"close\") to generate sepsis reassessment note. YES (proceed by clicking \"NEXT\")  -DJ        Repeat Volume Status and Tissue Perfusion Assessment Performed --                  User Key  (r) = Recorded By, (t) = Taken By, (c) = Cosigned By      Initials Name Provider Type    DJ Carlos Torres MD Physician                    Body mass index is 28.88 kg/m².  Wt Readings from Last 1 Encounters:   03/13/25 64.9 kg (143 lb)        Ideal body weight: 51.7 kg (113 lb 15.6 oz)  Adjusted ideal body weight: 57 kg (125 lb 9.4 oz)  Patient with intermittent hypotension.  Stress dose steroids administered.  Capillary refill still approximately 5 to 7 seconds.  No identifiable source of infection.  CT scan will be performed.  "

## 2025-03-13 NOTE — CONSULTS
Medical Oncology/Hematology Consult Note  Rogelio Guerra, male, 59 y.o., 1965,  ED 20/ED 20, [id number]     Assessment and Plan  1.  Extensive DVT:  Patient is 59-year-old male with history of Down syndrome, hypothyroidism, early onset Alzheimer, pressure injuries and adrenal insufficiency was sent from facility for altered mental status.  CT chest abdomen pelvis remarkable for extensive DVT involving inferior vena cava, bilateral external iliac veins and left femoral vein visualized.  CTA chest unremarkable for any PE.  Doppler ultrasound pending.  Suspected in setting of immobility, as imaging is not concerning for any underlying malignancy.  Given extensive clot burden, patient will benefit from lifelong anticoagulation.  IR consulted, if no procedures are scheduled can switch to Eliquis 10 mg twice daily for 7 days followed by 5 mg twice daily.  If not covered in insurance can try for Xarelto or Lovenox.    Outpatient follow up plan: To be decided.    Communication with patient/family:  I tried calling patient's facility's contact number, was directed to voice message.    Communication with team:  Did communicate with primary team.    I did review this patient with Dr. Lehman and she is in agreement with this plan.        Reason for consultation: Extensive DVT.    History of present illness:  Rogelio Guerra is a 59 y.o. male with past medical history significant for early onset dementia, pressure injuries, adrenal insufficiency, Down syndrome, hypothyroidism was sent from [facility] for altered mental status.    Per chart review patient was recently admitted to [facility] for sepsis in setting of pneumonia, where he completed antibiotics course during his stay.  On presentation to ED he remained afebrile, saturating well on room air however blood pressure was noted on the softer side, initially 81/47 that responded to fluid resuscitation and at time of my evaluation his blood pressure had  improved to 115/77.  CT chest abdomen pelvis in ED was remarkable for extensive deep venous thrombosis in inferior vena cava, bilateral common and external iliac veins, visualized left common femoral vein.  CTA chest unremarkable for DVT.  IR is consulted to further evaluate for thrombolysis.  Hematology team consulted to further evaluate extensive DVT.    At time of my evaluation patient is awake but not cooperative or communicating to encounter, hence history obtained by discussing with ED staff, reviewing paperwork sent from [facility].  Apparently patient is nonverbal at baseline.  I tried calling facilities contact number to further inquire about patient's baseline however was directed to voice message.       Review of Systems:    Review of Systems   Unable to perform ROS: Dementia       Oncology History:   Cancer Staging   No matching staging information was found for the patient.    Oncology History    No history exists.       Past Medical History:   Past Medical History:   Diagnosis Date    Alzheimer disease (HCC)     Blepharitis     Diverticulitis     Down syndrome     GERD (gastroesophageal reflux disease)     Hyperlipemia     Hypotension     Hypothyroidism     Osteoarthritis        Past Surgical History:   Procedure Laterality Date    COLONOSCOPY      VASECTOMY         No family history on file.    Social History     Socioeconomic History    Marital status: Single     Spouse name: Not on file    Number of children: Not on file    Years of education: Not on file    Highest education level: Not on file   Occupational History    Not on file   Tobacco Use    Smoking status: Never    Smokeless tobacco: Never   Vaping Use    Vaping status: Never Used   Substance and Sexual Activity    Alcohol use: Never    Drug use: Not on file    Sexual activity: Not on file   Other Topics Concern    Not on file   Social History Narrative    Not on file     Social Drivers of Health     Financial Resource Strain: Patient Unable  To Answer (1/28/2025)    Received from [facility]    Financial Insecurity     In the last 12 months did you skip medications to save money?: Unable to Assess     In the last 12 months was there a time when you needed to see a doctor but could not because of cost?: Unable to Assess   Food Insecurity: Patient Unable To Answer (1/28/2025)    Received from [facility]    Food Insecurity     In the last 12 months did you ever eat less than you felt you should because there wasn't enough money for food?: Unable to Assess   Transportation Needs: Patient Unable To Answer (1/28/2025)    Received from [facility]    Transportation Needs     In the last 12 months have you ever had to go without healthcare because you didn't have a way to get there?: Unable to Assess   Physical Activity: Not on file   Stress: Not on file   Social Connections: Patient Unable To Answer (1/28/2025)    Received from [facility]    Social Connection     Do you often feel lonely?: Unable to Assess   Intimate Partner Violence: Patient Unable To Answer (12/31/2024)    Received from [facility]    Humiliation, Afraid, Rape, and Kick questionnaire     Fear of Current or Ex-Partner: Patient unable to answer     Emotionally Abused: Patient unable to answer     Physically Abused: Patient unable to answer     Sexually Abused: Patient unable to answer   Housing Stability: Patient Unable To Answer (1/28/2025)    Received from [facility]    Housing Stability     Are you worried that in the next 2 months you may not have stable housing?: Unable to Assess         Current Facility-Administered Medications:     acetaminophen (TYLENOL) tablet 650 mg, 650 mg, Oral, Q6H PRN, Keiry Carlos MD    aluminum-magnesium hydroxide-simethicone (MAALOX) oral suspension 30 mL, 30 mL, Oral, Q6H PRN, Keiry Carlos MD    atorvastatin (LIPITOR) tablet 20 mg, 20  mg, Oral, Daily, Keiry Carlos MD    famotidine (PEPCID) tablet 20 mg, 20 mg, Oral, BID, Keiry Carlos MD    heparin (porcine) 25,000 units in 0.45% NaCl 250 mL infusion (premix), 3-30 Units/kg/hr (Order-Specific), Intravenous, Titrated, Carlos Torres MD, Last Rate: 11.7 mL/hr at 03/13/25 1351, 18 Units/kg/hr at 03/13/25 1351    heparin (porcine) injection 2,600 Units, 2,600 Units, Intravenous, Q6H PRN, Carlos Torres MD    heparin (porcine) injection 5,200 Units, 5,200 Units, Intravenous, Q6H PRN, Carlos Torres MD    hydrocortisone (Solu-CORTEF) injection 50 mg, 50 mg, Intravenous, Q6H, Keiry Carlos MD, 50 mg at 03/13/25 1454    hydrocortisone 1 % cream, , Topical, BID, Keiry Carlos MD    [START ON 3/14/2025] levothyroxine tablet 88 mcg, 88 mcg, Oral, Early Morning, Keiry Carlos MD    mirtazapine (REMERON) tablet 15 mg, 15 mg, Oral, HS, Keiry Carlos MD    ondansetron (ZOFRAN) injection 4 mg, 4 mg, Intravenous, Q6H PRN, Keiry Carlos MD    [START ON 3/14/2025] pantoprazole (PROTONIX) EC tablet 20 mg, 20 mg, Oral, Early Morning, Keiry Carlos MD    polyethylene glycol (MIRALAX) packet 17 g, 17 g, Oral, Daily, Keiry Carlos MD, 17 g at 03/13/25 1452    sodium chloride 0.9 % infusion, 100 mL/hr, Intravenous, Continuous, Keiry Carlos MD, Last Rate: 100 mL/hr at 03/13/25 1453, 100 mL/hr at 03/13/25 1453    white petrolatum-mineral oil (EUCERIN,HYDROCERIN) cream, , Topical, TID PRN, Keiry Carlos MD    Current Outpatient Medications:     acetaminophen (TYLENOL) 500 mg tablet, Take 500 mg by mouth every 6 (six) hours as needed for mild pain, Disp: , Rfl:     atorvastatin (LIPITOR) 20 mg tablet, Take 20 mg by mouth daily, Disp: , Rfl:     celecoxib (CeleBREX) 200 mg capsule, Take 200 mg by mouth 2 (two) times a day, Disp: , Rfl:     famotidine (PEPCID) 20 mg tablet, Take 20 mg by mouth 2 (two)  "times a day, Disp: , Rfl:     hydrocortisone 0.5 % cream, Apply topically 2 (two) times a day, Disp: , Rfl:     Levothyroxine Sodium 88 MCG CAPS, Take by mouth, Disp: , Rfl:     mirtazapine (REMERON) 15 mg tablet, Take 15 mg by mouth daily at bedtime, Disp: , Rfl:     omeprazole (PriLOSEC) 20 mg delayed release capsule, Take 20 mg by mouth daily, Disp: , Rfl:     Skin Protectants, Misc. (MINERIN CREME EX), Apply topically, Disp: , Rfl:     Not in a hospital admission.    Allergies   Allergen Reactions    Naproxen Abdominal Pain     Abdominal Bleeding         Physical Exam:     /66 (BP Location: Right arm)   Pulse 90   Temp 97.9 °F (36.6 °C) (Axillary)   Resp 17   Ht 4' 11\" (1.499 m)   Wt 64.9 kg (143 lb)   SpO2 96%   BMI 28.88 kg/m²     Physical Exam  HENT:      Head: Normocephalic and atraumatic.   Eyes:      Conjunctiva/sclera: Conjunctivae normal.   Cardiovascular:      Rate and Rhythm: Normal rate and regular rhythm.      Pulses: Normal pulses.      Heart sounds: Normal heart sounds.   Pulmonary:      Comments: Faint crackles bilaterally, no wheezes.  Abdominal:      Palpations: Abdomen is soft.      Comments: Mild tenderness elicited on palpation of epigastrium.   Musculoskeletal:      Right lower leg: Edema present.      Left lower leg: Edema present.      Comments: Chronic skin changes noted in feet mainly involving great toes and plantar surface of second and third toes.   Neurological:      Comments: Unable to evaluate neuroexam as patient was not following commands.  Per chart review patient is nonverbal at baseline.         Recent Results (from the past 48 hours)   ECG 12 lead    Collection Time: 03/13/25  6:27 AM   Result Value Ref Range    Ventricular Rate 84 BPM    Atrial Rate 84 BPM    VT Interval 128 ms    QRSD Interval 90 ms    QT Interval 354 ms    QTC Interval 418 ms    P [place] 59 degrees    QRS Axis 74 degrees    T Wave Axis 9 degrees   CBC and differential    Collection Time: " 03/13/25  7:21 AM   Result Value Ref Range    WBC 5.01 4.31 - 10.16 Thousand/uL    RBC 3.78 (L) 3.88 - 5.62 Million/uL    Hemoglobin 11.7 (L) 12.0 - 17.0 g/dL    Hematocrit 37.4 36.5 - 49.3 %    MCV 99 (H) 82 - 98 fL    MCH 31.0 26.8 - 34.3 pg    MCHC 31.3 (L) 31.4 - 37.4 g/dL    RDW 19.5 (H) 11.6 - 15.1 %    MPV 10.1 8.9 - 12.7 fL    Platelets 330 149 - 390 Thousands/uL    nRBC 0 /100 WBCs    Segmented % 53 43 - 75 %    Immature Grans % 1 0 - 2 %    Lymphocytes % 31 14 - 44 %    Monocytes % 13 (H) 4 - 12 %    Eosinophils Relative 1 0 - 6 %    Basophils Relative 1 0 - 1 %    Absolute Neutrophils 2.70 1.85 - 7.62 Thousands/µL    Absolute Immature Grans 0.04 0.00 - 0.20 Thousand/uL    Absolute Lymphocytes 1.55 0.60 - 4.47 Thousands/µL    Absolute Monocytes 0.64 0.17 - 1.22 Thousand/µL    Eosinophils Absolute 0.05 0.00 - 0.61 Thousand/µL    Basophils Absolute 0.03 0.00 - 0.10 Thousands/µL   TSH, 3rd generation with Free T4 reflex    Collection Time: 03/13/25  7:21 AM   Result Value Ref Range    TSH 3RD GENERATON 5.657 (H) 0.450 - 4.500 uIU/mL   Comprehensive metabolic panel    Collection Time: 03/13/25  7:23 AM   Result Value Ref Range    Sodium 142 135 - 147 mmol/L    Potassium 3.5 3.5 - 5.3 mmol/L    Chloride 103 96 - 108 mmol/L    CO2 30 21 - 32 mmol/L    ANION GAP 9 4 - 13 mmol/L    BUN 10 5 - 25 mg/dL    Creatinine 0.64 0.60 - 1.30 mg/dL    Glucose 99 65 - 140 mg/dL    Calcium 8.4 8.4 - 10.2 mg/dL    Corrected Calcium 9.5 8.3 - 10.1 mg/dL    AST 25 13 - 39 U/L    ALT 35 7 - 52 U/L    Alkaline Phosphatase 67 34 - 104 U/L    Total Protein 7.0 6.4 - 8.4 g/dL    Albumin 2.6 (L) 3.5 - 5.0 g/dL    Total Bilirubin 0.55 0.20 - 1.00 mg/dL    eGFR 105 ml/min/1.73sq m   Lactic acid    Collection Time: 03/13/25  7:23 AM   Result Value Ref Range    LACTIC ACID 2.3 (H) 0.5 - 2.0 mmol/L   Procalcitonin    Collection Time: 03/13/25  7:23 AM   Result Value Ref Range    Procalcitonin 0.16 <=0.25 ng/ml   Protime-INR    Collection  Time: 03/13/25  7:23 AM   Result Value Ref Range    Protime 14.4 12.3 - 15.0 seconds    INR 1.09 0.85 - 1.19   APTT    Collection Time: 03/13/25  7:23 AM   Result Value Ref Range    PTT 27 23 - 34 seconds   UA w Reflex to Microscopic w Reflex to Culture    Collection Time: 03/13/25  7:43 AM    Specimen: Urine, Straight Cath   Result Value Ref Range    Color, UA Light Yellow     Clarity, UA Clear     Specific Gravity, UA 1.009 1.003 - 1.030    pH, UA 7.0 4.5, 5.0, 5.5, 6.0, 6.5, 7.0, 7.5, 8.0    Leukocytes, UA Negative Negative    Nitrite, UA Negative Negative    Protein, UA Trace (A) Negative mg/dl    Glucose, UA Negative Negative mg/dl    Ketones, UA Negative Negative mg/dl    Urobilinogen, UA <2.0 <2.0 mg/dl mg/dl    Bilirubin, UA Negative Negative    Occult Blood, UA Negative Negative   Urine Microscopic    Collection Time: 03/13/25  7:43 AM   Result Value Ref Range    RBC, UA None Seen None Seen, 1-2 /hpf    WBC, UA None Seen None Seen, 1-2 /hpf    Epithelial Cells None Seen None Seen, Occasional /hpf    Bacteria, UA None Seen None Seen, Occasional /hpf    MUCUS THREADS None Seen None Seen    Amorphous Crystals, UA Occasional    Lactic acid 2 Hours    Collection Time: 03/13/25 10:19 AM   Result Value Ref Range    LACTIC ACID 0.9 0.5 - 2.0 mmol/L       CT chest abdomen pelvis w contrast  Result Date: 3/13/2025  Narrative: CT CHEST, ABDOMEN AND PELVIS WITH IV CONTRAST INDICATION: pneumonia. COMPARISON: None. TECHNIQUE: CT examination of the chest, abdomen and pelvis was performed. Multiplanar 2D reformatted images were created from the source data. This examination, like all CT scans performed in the [facility], was performed utilizing techniques to minimize radiation dose exposure, including the use of iterative reconstruction and automated exposure control. Radiation dose length product (DLP) for this visit: 790 mGy-cm IV Contrast: 100 mL of iohexol Enteric Contrast: Not administered. FINDINGS:  CHEST LUNGS: Study limited by respiratory motion artifact. Mild bibasilar compressive atelectasis. Secretions or mucous plug in the right lateral proximal trachea. PLEURA: Small bilateral pleural effusions. HEART/GREAT VESSELS: Normal heart size. Mild coronary artery calcifications. No thoracic aortic aneurysm. Aberrant right subclavian artery. MEDIASTINUM AND JORGE: Unremarkable. CHEST WALL AND LOWER NECK: Unremarkable. ABDOMEN LIVER/BILIARY TREE: Unremarkable. GALLBLADDER: No calcified gallstones. No pericholecystic inflammatory change. SPLEEN: Unremarkable. PANCREAS: Unremarkable. ADRENAL GLANDS: Unremarkable. KIDNEYS/URETERS: Small calculus in the right upper pole. No hydronephrosis. STOMACH AND BOWEL: Unremarkable. APPENDIX: No findings to suggest appendicitis. ABDOMINOPELVIC CAVITY: No ascites. No pneumoperitoneum. No lymphadenopathy. VESSELS: Extensive nonocclusive thrombus in the inferior vena cava extending cephalad to the level of the renal vein confluence, and in the bilateral common and external iliac veins, and visualized left common femoral vein. No definite filling defects noted in the central or proximal segmental pulmonary arteries. PELVIS REPRODUCTIVE ORGANS: Unremarkable for patient's age. URINARY BLADDER: Unremarkable. ABDOMINAL WALL/INGUINAL REGIONS: Mild body wall edema. BONES: No acute fracture or suspicious osseous lesion. Bilateral pars defects at L5 with grade 1 anterolisthesis at L5-S1.     Impression: Study limited by respiratory motion artifact. Small bilateral pleural effusions with mild bibasilar compressive atelectasis. Extensive deep venous thrombosis in the inferior vena cava, bilateral common and external iliac veins, and visualized left common femoral vein. Right nephrolithiasis. I personally discussed this study with TARAN MENDOZA on 3/13/2025 12:52 PM. Workstation performed: [id number]     XR chest portable  Result Date: 3/13/2025  Narrative: XR CHEST PORTABLE INDICATION:  concern for pna. COMPARISON: None FINDINGS: Monitoring leads and clips project over the chest. Clear lungs. No pneumothorax or pleural effusion. Normal cardiomediastinal silhouette. Bones are unremarkable for age. Normal upper abdomen.     Impression: No acute cardiopulmonary disease. Workstation performed: [id number]     XR chest 1 view  Result Date: 3/6/2025  Narrative: Examination: Portable chest x-ray Comparisons: 1/28/2025 and 12/31/2024. INDICATION: Sepsis alert. FINDINGS: An AP semierect portable chest x-ray obtained at 1306 hours on 3/6/2025 demonstrates new parenchymal opacity in the left lung base with evidence of subsegmental atelectasis versus pneumonia. The lungs are otherwise clear. The vascular markings and pleural surfaces appear normal. There are no pleural effusions. The cardiomediastinal silhouette is stable without cardiac enlargement. The osseous structures and the upper abdomen are unremarkable.    Impression: IMPRESSION: Evidence of pneumonia versus subsegmental atelectasis in the left lung base. Clinical correlation is recommended. Workstation:[id number]      Labs and pertinent reports reviewed.      This note has been generated by voice recognition software system.  Therefore, there may be spelling, grammar, and or syntax errors. Please contact if questions arise.

## 2025-03-13 NOTE — ASSESSMENT & PLAN NOTE
History of adrenal sufficiency  Recent hospitalization notes reviewed patient on hydrocortisone 15 mg in the morning and 5 mg in the evening  Presently with hypotension we will have him on stress dose hydrocortisone 50 mg every 6 hours with plans to taper over the next 24 to 48 hours depending on hemodynamic stability

## 2025-03-13 NOTE — ASSESSMENT & PLAN NOTE
Presents with altered mental status  Check CT head  Unknown baseline  Avoid neurotoxins  Optimize metabolic parameters  Supportive cares

## 2025-03-14 PROBLEM — L98.429 SACRAL ULCER (HCC): Status: ACTIVE | Noted: 2025-03-14

## 2025-03-14 PROBLEM — E87.6 HYPOKALEMIA: Status: ACTIVE | Noted: 2025-03-14

## 2025-03-14 LAB
ALBUMIN SERPL BCG-MCNC: 2.2 G/DL (ref 3.5–5)
ALP SERPL-CCNC: 60 U/L (ref 34–104)
ALT SERPL W P-5'-P-CCNC: 29 U/L (ref 7–52)
ANION GAP SERPL CALCULATED.3IONS-SCNC: 5 MMOL/L (ref 4–13)
APTT PPP: 49 SECONDS (ref 23–34)
APTT PPP: 56 SECONDS (ref 23–34)
APTT PPP: 66 SECONDS (ref 23–34)
AST SERPL W P-5'-P-CCNC: 21 U/L (ref 13–39)
BILIRUB SERPL-MCNC: 0.28 MG/DL (ref 0.2–1)
BUN SERPL-MCNC: 8 MG/DL (ref 5–25)
CALCIUM ALBUM COR SERPL-MCNC: 8.8 MG/DL (ref 8.3–10.1)
CALCIUM SERPL-MCNC: 7.4 MG/DL (ref 8.4–10.2)
CHLORIDE SERPL-SCNC: 107 MMOL/L (ref 96–108)
CO2 SERPL-SCNC: 28 MMOL/L (ref 21–32)
CREAT SERPL-MCNC: 0.52 MG/DL (ref 0.6–1.3)
GFR SERPL CREATININE-BSD FRML MDRD: 115 ML/MIN/1.73SQ M
GLUCOSE SERPL-MCNC: 126 MG/DL (ref 65–140)
MAGNESIUM SERPL-MCNC: 1.9 MG/DL (ref 1.9–2.7)
POTASSIUM SERPL-SCNC: 3 MMOL/L (ref 3.5–5.3)
PROT SERPL-MCNC: 5.8 G/DL (ref 6.4–8.4)
SODIUM SERPL-SCNC: 140 MMOL/L (ref 135–147)

## 2025-03-14 PROCEDURE — 80053 COMPREHEN METABOLIC PANEL: CPT | Performed by: INTERNAL MEDICINE

## 2025-03-14 PROCEDURE — 99233 SBSQ HOSP IP/OBS HIGH 50: CPT | Performed by: PHYSICIAN ASSISTANT

## 2025-03-14 PROCEDURE — 85730 THROMBOPLASTIN TIME PARTIAL: CPT | Performed by: FAMILY MEDICINE

## 2025-03-14 PROCEDURE — 92610 EVALUATE SWALLOWING FUNCTION: CPT

## 2025-03-14 PROCEDURE — 83735 ASSAY OF MAGNESIUM: CPT | Performed by: INTERNAL MEDICINE

## 2025-03-14 RX ORDER — POTASSIUM CHLORIDE 1500 MG/1
20 TABLET, EXTENDED RELEASE ORAL ONCE
Status: COMPLETED | OUTPATIENT
Start: 2025-03-14 | End: 2025-03-14

## 2025-03-14 RX ORDER — LEVOTHYROXINE SODIUM 100 UG/1
100 TABLET ORAL
Status: DISCONTINUED | OUTPATIENT
Start: 2025-03-15 | End: 2025-03-19 | Stop reason: HOSPADM

## 2025-03-14 RX ORDER — POTASSIUM CHLORIDE 14.9 MG/ML
20 INJECTION INTRAVENOUS
Status: COMPLETED | OUTPATIENT
Start: 2025-03-14 | End: 2025-03-14

## 2025-03-14 RX ADMIN — HYDROCORTISONE SODIUM SUCCINATE 50 MG: 100 INJECTION, POWDER, FOR SOLUTION INTRAMUSCULAR; INTRAVENOUS at 14:33

## 2025-03-14 RX ADMIN — POTASSIUM CHLORIDE 20 MEQ: 1500 TABLET, EXTENDED RELEASE ORAL at 18:10

## 2025-03-14 RX ADMIN — HYDROCORTISONE SODIUM SUCCINATE 50 MG: 100 INJECTION, POWDER, FOR SOLUTION INTRAMUSCULAR; INTRAVENOUS at 08:06

## 2025-03-14 RX ADMIN — PANTOPRAZOLE SODIUM 20 MG: 20 TABLET, DELAYED RELEASE ORAL at 05:53

## 2025-03-14 RX ADMIN — HEPARIN SODIUM 2600 UNITS: 1000 INJECTION INTRAVENOUS; SUBCUTANEOUS at 20:06

## 2025-03-14 RX ADMIN — MIRTAZAPINE 15 MG: 15 TABLET, FILM COATED ORAL at 21:44

## 2025-03-14 RX ADMIN — HYDROCORTISONE SODIUM SUCCINATE 50 MG: 100 INJECTION, POWDER, FOR SOLUTION INTRAMUSCULAR; INTRAVENOUS at 20:06

## 2025-03-14 RX ADMIN — HYDROCORTISONE SODIUM SUCCINATE 50 MG: 100 INJECTION, POWDER, FOR SOLUTION INTRAMUSCULAR; INTRAVENOUS at 03:43

## 2025-03-14 RX ADMIN — LEVOTHYROXINE SODIUM 88 MCG: 88 TABLET ORAL at 05:53

## 2025-03-14 RX ADMIN — HEPARIN SODIUM 16 UNITS/KG/HR: 10000 INJECTION, SOLUTION INTRAVENOUS at 13:12

## 2025-03-14 RX ADMIN — SODIUM CHLORIDE 100 ML/HR: 0.9 INJECTION, SOLUTION INTRAVENOUS at 03:03

## 2025-03-14 RX ADMIN — POTASSIUM CHLORIDE 20 MEQ: 14.9 INJECTION, SOLUTION INTRAVENOUS at 14:06

## 2025-03-14 RX ADMIN — POTASSIUM CHLORIDE 20 MEQ: 14.9 INJECTION, SOLUTION INTRAVENOUS at 11:47

## 2025-03-14 RX ADMIN — FAMOTIDINE 20 MG: 20 TABLET, FILM COATED ORAL at 18:10

## 2025-03-14 RX ADMIN — HYDROCORTISONE: 1 CREAM TOPICAL at 18:10

## 2025-03-14 NOTE — APP STUDENT NOTE
ANAT STUDENT  Inpatient Progress Note for TRAINING ONLY  Not Part of Legal Medical Record     Progress Note - Rogelio Guerra 59 y.o. male MRN: [id number]  Unit/Bed#: [facility] 528-01 Encounter: [id number]    Assessment:    Principal Problem:    Acute extensive venous thrombosis  Active Problems:    Altered mental status    Down syndrome    Hypothyroidism    Adrenal insufficiency (HCC)    Anxiety      Plan:    Acute extensive venous thrombosis  Presented to ED w/ AMS, recently d/c from [facility] for CAP complicated by hypotension, sepsis  3/13 lactic elevated 2.3, down to .9   3/13 PTT 96, waiting for most recent   3/13 CT chest A/P: Extensive deep venous thrombosis in the inferior vena cava, bilateral common and external iliac veins, and visualized left common femoral vein   3/13 CTA chest showed no PE  Bilateral limb duplex -- awaiting results  3/13 CT head no acute cranial abnormality  Appreciate IR consult and recommendations  Recommends continue AC therapy  No plan for invasive procedure  Appreciate heme/ onc consult and recommendations  Switch heparin gtt to Eliquis twice daily for  7 days followed by 5 mg twice daily if two consecutive aPTT are in normal range.  If not covered in insurance can try for Xarelto or Lovenox.   Imaging not concerning for underlying malignancy  Lifelong AC  Continue to monitor hemodynamics    Altered mental status  Presented to ED w/ AMS, recently d/c from [facility] for CAP complicated by hypotension, sepsis  3/13 lactic elevated 2.3, down to .9   3/13 PTT 96, waiting for most recent   Procalcitonin negative, blood cultures pending  UA WNL  3/13 CT head no acute cranial abnormality  Most likely metabolic encephalopathy in setting of DVT  Continue to monitor hemodynamics -- give fluids as needed  Avoid neurotoxins    Down syndrome  Hx of down syndrome and early onset dementia  Continue to monitor moods and provide supportive care as necessary  Hypothyroidism  TSH 5.657  Continue  levothyroxine 88 mcg p.o. daily per [facility] records  Recently [facility] hospitalization notes reviewed, patient was placed on levothyroxine 100 mcg  Monitor TSH in 4 to 6 weeks and outpatient follow-up    Adrenal insufficiency  History of adrenal insufficiency placed on hydrocortisone 15 mg AM and 5 mg in evening  On stress dose in setting of hypotension -- will taper dependent on clinical status     Anxiety  Continue mirtazapine and trazodone    Hypokalemia  Potassium 3.0 this morning - IV potassium chloride   AM BMP      VTE Pharmacologic Prophylaxis:   Pharmacologic: Heparin Drip  Mechanical VTE Prophylaxis in Place: No    Patient Centered Rounds: I have performed bedside rounds with nursing staff today.    Discussions with Specialists or Other Care Team Provider: IR    Education and Discussions with Family / Patient: n/a    Time Spent for Care: 30 minutes.  More than 50% of total time spent on counseling and coordination of care as described above.    Current Length of Stay: 1 day(s)    Current Patient Status: Inpatient   Certification Statement: The patient will continue to require additional inpatient hospital stay due to ***    Discharge Plan: ***    Code Status: Level 1 - Full Code    Subjective:   History limited as patient is nonverbal     Objective:     Vitals:   Temp (24hrs), Av °F (36.7 °C), Min:97.5 °F (36.4 °C), Max:98.5 °F (36.9 °C)    Temp:  [97.5 °F (36.4 °C)-98.5 °F (36.9 °C)] 97.5 °F (36.4 °C)  HR:  [] 81  Resp:  [13-32] 18  BP: ()/(43-77) 105/61  SpO2:  [91 %-98 %] 98 %  Body mass index is 28.88 kg/m².     Input and Output Summary (last 24 hours):       Intake/Output Summary (Last 24 hours) at 3/14/2025 0991  Last data filed at 3/14/2025 0819  Gross per 24 hour   Intake 250 ml   Output 450 ml   Net -200 ml       Physical Exam:     Physical Exam  Constitutional:       Appearance: Normal appearance.   HENT:      Head: Normocephalic and atraumatic.      Nose: Nose normal.    Cardiovascular:      Rate and Rhythm: Normal rate and regular rhythm.      Pulses: Normal pulses.      Heart sounds: Normal heart sounds.   Pulmonary:      Effort: Pulmonary effort is normal.   Abdominal:      General: Abdomen is flat.   Musculoskeletal:      Comments: Mild bilateral swelling of lower extremities. Difficulty finding pulses   Skin:     General: Skin is warm.      Capillary Refill: Capillary refill takes less than 2 seconds.   Neurological:      Mental Status: He is alert. Mental status is at baseline.   Psychiatric:         Mood and Affect: Mood normal.         Behavior: Behavior normal.           Historical Information   Past Medical History:   Diagnosis Date    Alzheimer disease (HCC)     Blepharitis     Diverticulitis     Down syndrome     GERD (gastroesophageal reflux disease)     Hyperlipemia     Hypotension     Hypothyroidism     Osteoarthritis      Past Surgical History:   Procedure Laterality Date    COLONOSCOPY      VASECTOMY       Social History   Social History     Substance and Sexual Activity   Alcohol Use Never     Social History     Substance and Sexual Activity   Drug Use Not on file     Social History     Tobacco Use   Smoking Status Never   Smokeless Tobacco Never     Family History: Family history non-contributory    Meds/Allergies   all medications and allergies reviewed  Allergies   Allergen Reactions    Naproxen Abdominal Pain     Abdominal Bleeding       Additional Data:     Labs:    Results from last 7 days   Lab Units 03/13/25  0721   WBC Thousand/uL 5.01   HEMOGLOBIN g/dL 11.7*   HEMATOCRIT % 37.4   PLATELETS Thousands/uL 330   SEGS PCT % 53   LYMPHO PCT % 31   MONO PCT % 13*   EOS PCT % 1     Results from last 7 days   Lab Units 03/14/25  0557   SODIUM mmol/L 140   POTASSIUM mmol/L 3.0*   CHLORIDE mmol/L 107   CO2 mmol/L 28   BUN mg/dL 8   CREATININE mg/dL 0.52*   ANION GAP mmol/L 5   CALCIUM mg/dL 7.4*   ALBUMIN g/dL 2.2*   TOTAL BILIRUBIN mg/dL 0.28   ALK PHOS U/L 60    ALT U/L 29   AST U/L 21   GLUCOSE RANDOM mg/dL 126     Results from last 7 days   Lab Units 03/13/25  0723   INR  1.09             Results from last 7 days   Lab Units 03/13/25  1019 03/13/25  0723   LACTIC ACID mmol/L 0.9 2.3*   PROCALCITONIN ng/ml  --  0.16         * I Have Reviewed All Lab Data Listed Above.  * Additional Pertinent Lab Tests Reviewed: All Labs For Current Hospital Admission Reviewed    Imaging:    Imaging Reports Reviewed Today Include: n/a  Imaging Personally Reviewed by Myself Includes:  n/a    Recent Cultures (last 7 days):     Results from last 7 days   Lab Units 03/13/25  0736 03/13/25  0723   BLOOD CULTURE  Received in Microbiology Lab. Culture in Progress. Received in Microbiology Lab. Culture in Progress.       Last 24 Hours Medication List:   Current Facility-Administered Medications   Medication Dose Route Frequency Provider Last Rate    acetaminophen  650 mg Oral Q6H PRN Keiry Carlos MD      aluminum-magnesium hydroxide-simethicone  30 mL Oral Q6H PRN Keiry Carlos MD      atorvastatin  20 mg Oral Daily Keiry Carlos MD      famotidine  20 mg Oral BID Keiry Carlos MD      heparin (porcine)  3-30 Units/kg/hr (Order-Specific) Intravenous Titrated Carlos Torres MD 16 Units/kg/hr (03/13/25 2211)    heparin (porcine)  2,600 Units Intravenous Q6H PRN Carlos Torres MD      heparin (porcine)  5,200 Units Intravenous Q6H PRN Carlos Torres MD      hydrocortisone sodium succinate  50 mg Intravenous Q6H Keiry Carlos MD      hydrocortisone   Topical BID Keiry Carlos MD      levothyroxine  88 mcg Oral Early Morning Keiry Carlos MD      mirtazapine  15 mg Oral HS Keiry Carlos MD      ondansetron  4 mg Intravenous Q6H PRN Keiry Carlos MD      pantoprazole  20 mg Oral Early Morning Keiry Carlos MD      polyethylene glycol  17 g Oral Daily Keiry Carlos MD       potassium chloride  20 mEq Intravenous Q2H Mary Davis PA-C      sodium chloride  100 mL/hr Intravenous Continuous Keiry Carlos  mL/hr (03/14/25 0303)    white petrolatum-mineral oil   Topical TID PRN Keiry Carlos MD          Today, Patient Was Seen By: Clover Higgins    ** Please Note: Dictation voice to text software may have been used in the creation of this document. **

## 2025-03-14 NOTE — WOUND OSTOMY CARE
Consult Note - Wound   Rogelio Guerra 59 y.o. male MRN: [id number]  Unit/Bed#: [facility] 528-01 Encounter: [id number]        History and Present Illness:  Patient is a 60 yo male that was admitted to [facility] for treatment of acute extensive venous thrombosis. Patient has a PMH of down syndrome, early onset dementia, adrenal insufficiency. Patient is a max assist for turning and repositioning- dependent for all care needs. Patient is incontinent of bowel and bladder. On assessment, patient is seen lying on regular mattress.      Wound Care was consulted for multiple POA wounds.     Assessment Findings:   B/L heels are dry intact and magnolia with no skin loss or wounds present. Recommend preventative foam dressings and proper offloading/ repositioning.          POA Mid Sacro-Buttocks Evolving DTPI: irregular in shape area of full thickness skin loss. Wound bed with 20% dark purple non-blanchable tissue and 80% yellow slough tissue. Mis-wound is fragile. Moderate serosanguineous and yellow in color drainage noted. Recommend silver alginate and foam dressing.    POA Posterior Scrotum Evolving DTPI: irregular in shape area of partial thickness skin loss. Wound bed with mix of 10% dark purple and 90% yellow tissue. Mis-wound is fragile. Scant serosanguinous drainage noted. Recommend calazime cream   POA Right Ischium DTPI: irregular in shape area of intact dark purple non-blanchable tissue. Non skin loss or drainage noted. Recommend calazime. Left ischium is intact with no wounds present  POA Right Lateral/Anterior 5th Toe: intact, dry black/brown tissue. Unable to determine etiology- vascular vs pressure. No skin loss or drainage noted.   POA Right Medial Great Toe DTPI: oval in shape area of intact purple non-blanchable tissue. No skin loss or drainage noted. Mis-wound is dry and calloused. Recommend hydraguard.   POA Left Medial Great Toe DTPI: oval in shape area of intact purple non-blanchable tissue. No skin  loss or drainage noted. Mis-wound is dry and calloused. Recommend hydraguard.   POA Left Medial Distal Foot DTPI: circular in shape area of intact purple non-blanchable tissue. No skin loss or drainage noted. Mis-wound is dry and calloused. Recommend hydraguard.       Deep Tissue Pressure Injury wounds have the potential to evolve into unstageable, stage III or stage IV wounds.      No induration, fluctuance, odor, warmth/temperature differences, redness, or purulence noted to the above noted wounds and skin areas assessed. New dressings applied per orders listed below. Patient tolerated well- no s/s of non-verbal pain or discomfort observed during the encounter. Bedside nurse aware of plan of care. See flow sheets for more detailed assessment findings.      Orders listed below and wound care will continue to follow, call or Secure Chat Message with questions.     Skin Care Plan:  1-P-500 Low Air Loss Mattress ordered for patient   2-Turn/reposition q2h or when medically stable for pressure re-distribution on skin. Utilize ATR turning and repositioning system for patient.  3-Elevate heels to offload pressure. Apply offloading boots to b/l feet.   4-Moisturize skin daily with skin nourishing cream  5-Ehob cushion in chair when out of bed.  6-Cleanse B/L Heels with soap and water. Pat dry. Apply Silicone Border Foam (Mepilex) to areas. Mika with P for Prevention and change every 3 days or PRN soilage/displacement. Peel back and inspect Q-shift.  7-Sacro-Buttocks: cleanse with NSS and pat dry. Apply silver alginate (Melgisorb Ag) to wound bed and cover with a silicone bordered foam dressing. Mika with T for treatment and change every other day or PRN soilage/displacement.   8-Apply calazime cream to Posterior Scrotum and B/L Ischium TID and PRN  9-Paint Right 5th toe eschar with betadine daily and leave CARSON.  10-Apply hydraguard to b/l medial great toes and left medial foot DTPIs BID and PRN      Wounds:  Wound  03/13/25 Pressure Injury Coccyx (Active)   Wound Image   03/14/25 0945   Wound Description Light purple;Non-blanchable erythema;Yellow;Slough 03/14/25 1100   Pressure Injury Stage DTPI 03/14/25 1100   Non-staged Wound Description Full thickness 03/14/25 1100   Wound Length (cm) 5.5 cm 03/14/25 0945   Wound Width (cm) 6.5 cm 03/14/25 0945   Wound Depth (cm) 1 cm 03/14/25 0945   Wound Surface Area (cm^2) 35.75 cm^2 03/14/25 0945   Wound Volume (cm^3) 35.75 cm^3 03/14/25 0945   Calculated Wound Volume (cm^3) 35.75 cm^3 03/14/25 0945   Drainage Amount Moderate 03/14/25 1100   Drainage Description Serosanguineous;Yellow 03/14/25 1100   Mis-wound Assessment Hyperpigmented;Fragile 03/14/25 1100   Treatments Cleansed;Irrigation with NSS;Site care 03/14/25 1100   Wound Site Closure None 03/14/25 1100   Dressing Calcium Alginate with Silver;Foam, Silicon (eg. Allevyn, etc) 03/14/25 1100   Dressing Changed New 03/14/25 1100   Patient Tolerance Tolerated well 03/14/25 1100   Dressing Status Intact;Dry;Clean 03/14/25 1100       Wound 03/13/25 Pressure Injury Scrotum (Active)   Wound Image   03/14/25 0948   Wound Description Light purple;Non-blanchable erythema;Yellow 03/14/25 1100   Pressure Injury Stage DTPI 03/14/25 1100   Non-staged Wound Description Partial thickness 03/14/25 1100   Wound Length (cm) 1 cm 03/14/25 0948   Wound Width (cm) 2 cm 03/14/25 0948   Wound Depth (cm) 0.1 cm 03/14/25 0948   Wound Surface Area (cm^2) 2 cm^2 03/14/25 0948   Wound Volume (cm^3) 0.2 cm^3 03/14/25 0948   Calculated Wound Volume (cm^3) 0.2 cm^3 03/14/25 0948   Drainage Amount Scant 03/14/25 1100   Drainage Description Serosanguineous 03/14/25 1100   Mis-wound Assessment Fragile 03/14/25 1100   Treatments Cleansed;Site care 03/14/25 1100   Wound Site Closure None 03/14/25 1100   Dressing Protective barrier 03/14/25 1100   Dressing Changed New 03/14/25 1100   Patient Tolerance Tolerated well 03/14/25 1100   Dressing Status Clean;Dry;Intact  03/14/25 1100       Wound 03/13/25 Pressure Injury Ischium Posterior;Right (Active)   Wound Image   03/14/25 1100   Wound Description Intact;Light purple;Non-blanchable erythema 03/14/25 1100   Pressure Injury Stage DTPI 03/14/25 1100   Non-staged Wound Description Not applicable 03/14/25 1100   Wound Length (cm) 2 cm 03/14/25 1100   Wound Width (cm) 2.5 cm 03/14/25 1100   Wound Depth (cm) 0 cm 03/14/25 1100   Wound Surface Area (cm^2) 5 cm^2 03/14/25 1100   Wound Volume (cm^3) 0 cm^3 03/14/25 1100   Calculated Wound Volume (cm^3) 0 cm^3 03/14/25 1100   Drainage Amount None 03/14/25 1100   Mis-wound Assessment Intact 03/14/25 1100   Treatments Cleansed 03/14/25 1100   Wound Site Closure None 03/14/25 1100   Dressing Protective barrier 03/14/25 1100   Dressing Changed New 03/14/25 1100   Patient Tolerance Tolerated well 03/14/25 1100   Dressing Status Intact 03/14/25 1100       Wound 03/13/25 Other (Comments) Toe D5, fifth Anterior;Right;Lateral (Active)   Wound Image   03/14/25 0953   Wound Description Intact;Black 03/14/25 1100   Non-staged Wound Description Not applicable 03/14/25 1100   Wound Length (cm) 1.5 cm 03/14/25 0953   Wound Width (cm) 1.5 cm 03/14/25 0953   Wound Depth (cm) 0 cm 03/14/25 1100   Wound Surface Area (cm^2) 2.25 cm^2 03/14/25 0953   Drainage Amount None 03/14/25 1100   Mis-wound Assessment Intact 03/14/25 1100   Treatments Cleansed;Site care 03/14/25 1100   Wound Site Closure Other (Comment) 03/14/25 1100   Dressing Changed New 03/14/25 1100   Patient Tolerance Tolerated well 03/14/25 1100   Dressing Status Intact 03/14/25 1100       Wound 03/14/25 Pressure Injury Toe D1, great Right;Medial (Active)   Wound Image   03/14/25 0956   Wound Description Intact;Light purple;Non-blanchable erythema 03/14/25 0956   Pressure Injury Stage DTPI 03/14/25 0956   Non-staged Wound Description Not applicable 03/14/25 0956   Wound Length (cm) 1 cm 03/14/25 0956   Wound Width (cm) 0.6 cm 03/14/25 0956    Wound Depth (cm) 0 cm 03/14/25 0956   Wound Surface Area (cm^2) 0.6 cm^2 03/14/25 0956   Wound Volume (cm^3) 0 cm^3 03/14/25 0956   Calculated Wound Volume (cm^3) 0 cm^3 03/14/25 0956   Mis-wound Assessment Dry;Callus 03/14/25 0956   Treatments Cleansed;Site care 03/14/25 0956   Dressing Protective barrier 03/14/25 0956   Dressing Changed New 03/14/25 0956   Patient Tolerance Tolerated well 03/14/25 0956   Dressing Status Clean;Dry;Intact 03/14/25 0956       Wound 03/14/25 Pressure Injury Toe D1, great Left;Medial (Active)   Wound Image   03/14/25 1100   Wound Description Intact;Light purple;Non-blanchable erythema 03/14/25 1100   Pressure Injury Stage DTPI 03/14/25 1100   Non-staged Wound Description Not applicable 03/14/25 1100   Wound Length (cm) 0.5 cm 03/14/25 1100   Wound Width (cm) 0.5 cm 03/14/25 1100   Wound Depth (cm) 0 cm 03/14/25 1100   Wound Surface Area (cm^2) 0.25 cm^2 03/14/25 1100   Wound Volume (cm^3) 0 cm^3 03/14/25 1100   Calculated Wound Volume (cm^3) 0 cm^3 03/14/25 1100   Drainage Amount None 03/14/25 1100   Mis-wound Assessment Intact 03/14/25 1100   Treatments Cleansed;Site care 03/14/25 1100   Wound Site Closure None 03/14/25 1100   Dressing Changed New 03/14/25 1100   Patient Tolerance Tolerated well 03/14/25 1100   Dressing Status Intact 03/14/25 1100       Wound 03/14/25 Pressure Injury Foot Left;Medial;Distal (Active)   Wound Image   03/14/25 1100   Wound Description Intact;Non-blanchable erythema;Light purple 03/14/25 1100   Pressure Injury Stage DTPI 03/14/25 1100   Non-staged Wound Description Not applicable 03/14/25 1100   Wound Length (cm) 1 cm 03/14/25 1100   Wound Width (cm) 1.5 cm 03/14/25 1100   Wound Depth (cm) 0 cm 03/14/25 1100   Wound Surface Area (cm^2) 1.5 cm^2 03/14/25 1100   Wound Volume (cm^3) 0 cm^3 03/14/25 1100   Calculated Wound Volume (cm^3) 0 cm^3 03/14/25 1100   Drainage Amount None 03/14/25 1100   Mis-wound Assessment Intact;Callus 03/14/25 1100    Treatments Cleansed;Site care 03/14/25 1100   Dressing Moisture barrier 03/14/25 1100   Dressing Changed New 03/14/25 1100   Patient Tolerance Tolerated well 03/14/25 1100   Dressing Status Intact 03/14/25 1100               Prachi Andrews RN, BSN, CWOCN

## 2025-03-14 NOTE — PHYSICAL THERAPY NOTE
Physical Therapy Screen    Patient Name: Rogelio Guerra    Today's Date: 3/14/2025        03/14/25 1221   PT Last Visit   PT Visit Date 03/14/25   Note Type   Note type Screen   Additional Comments Patient with extensive DVT, from a group home (jenny lift at baseline)     PT Orders received and chart reviewed. Spoke to CM and contacted group home, patient is bed bound and a jenny lift at baseline, no IPPT needs at this time (patient is at baseline). Will sign off IPPT at this time.     Effie Hayward PT, DPT

## 2025-03-14 NOTE — ASSESSMENT & PLAN NOTE
Presents with altered mental status, has hx Down syndrome and resides at care home  CT head- No acute intracranial abnormality. Chronic microangiopathic changes and moderate central greater than cortical volume loss  Infection workup including UA negative. Blood cultures negative at 24 hours  Avoid neurotoxins  Optimize metabolic parameters  Supportive cares  Spoke with nurse at care home. She states he has lived at the care home but more recently he was admitted to a nursing home for rehab after a hospitalization and it appears he developed an open sacral wound and had some decline during that time period  He is bedridden at baseline  He does eat a soft diet and takes his pills well at home  Updated contact info in EPIC- primary  over the weekend will be nurse Walker

## 2025-03-14 NOTE — ASSESSMENT & PLAN NOTE
TSH 5.657 free T4 1.33  [facility] records document he was on levothyroxine 88 mcg daily but hospital records from recent admissions to [facility] in Jan and March both say he should be on 100 mcg so dose adjusted  Monitor TSH in 4 to 6 weeks and outpatient follow-up

## 2025-03-14 NOTE — ASSESSMENT & PLAN NOTE
Transfer from skilled nursing/[facility] for altered mental status.  Recently hospitalized  at [facility] and discharged on 3/6/2025, he was diagnosed with sepsis and received treatment for pneumonia and completed antibiotics during his hospital stay.  Workup here in  ED with CT chest abdomen pelvis revealed extensive thrombosis of the IVC, bilateral iliac and left femoral veins  CT PE study- no PE  lower extremity venous Dopplers- bilateral- Evidence of non-occlusive deep venous thrombosis in the common femoral, femoral, and popliteal veins.  He has been placed on IV heparin gtt.  Continue IV heparin gtt for now  Discussed with IR to evaluate for clot removal versus thrombolysis- per IR today there is no plan for invasive procedure/thrombectomy  Was seen by heme-onc. Because there are no invasive procedures planned, could consider switching to DOAC, however would need 2 consecutive PTT's in therapeutic range before switching over. At this point both of his PTTs have not been in range. Nursing reports he was a very difficult stick today and a midline had to be placed for blood work.   A script for eliquis was sent to [facility] for a price check, called down to [facility] and script is free (not just for first month but they said its free ongoing).   Plan to start eliquis after the 2 PTTs is therapeutic range as recommended by heme/onc

## 2025-03-14 NOTE — PROCEDURES
Insert Complex Venous Access Line    Date/Time: 3/14/2025 12:53 PM    Performed by: Rosalind Sotomayor RN  Authorized by: Mary Davis PA-C    Patient location:  Bedside  Other Assisting Provider: No    Consent:     Consent obtained:  Verbal    Consent given by:  Patient    Risks discussed:  Arterial puncture, incorrect placement, nerve damage, bleeding and infection    Alternatives discussed:  No treatment, delayed treatment and alternative treatment  Universal protocol:     Procedure explained and questions answered to patient or proxy's satisfaction: yes      Immediately prior to procedure, a time out was called: yes      Relevant documents present and verified: yes      Test results available and properly labeled: yes      Radiology Images displayed and confirmed.  If images not available, report reviewed: yes      Required blood products, implants, devices, and special equipment available: yes      Site/side marked: yes      Patient identity confirmed:  Hospital-assigned identification number and arm band  Pre-procedure details:     Hand hygiene: Hand hygiene performed prior to insertion      Sterile barrier technique: All elements of maximal sterile technique followed      Skin preparation:  ChloraPrep    Skin preparation agent: Skin preparation agent completely dried prior to procedure    Procedure details:     Complex Venous Access Line Type: Midline      Complex Venous Access Line Indications: no peripheral vascular access      Orientation:  Left    Location:  [facility]    Catheter size:  18 gauge ([id number], LOT: [id number], EXP: 2025-10-31)    Total catheter length (cm):  10    Catheter out on skin (cm):  0    Max flow rate:  999    Arm circumference:  28    Patient evaluated for contraindications to access (i.e. fistula, thrombosis, etc): Yes      Site selection rationale:  Largest most patent vein    Approach: percutaneous technique used      Patient position:  Flat    Ultrasound image availability:   Not saved    Sterile ultrasound techniques: Sterile gel and sterile probe covers were used      Number of attempts:  1    Successful placement: yes      Landmarks identified: yes      Vessel of catheter tip end:  Sherlock 3CG confirmed  Anesthesia (see MAR for exact dosages):     Anesthesia method:  None  Post-procedure details:     Post-procedure:  Dressing applied and securement device placed    Assessment:  Blood return through all ports    Post-procedure complications: none      Patient tolerance of procedure:  Tolerated well, no immediate complications

## 2025-03-14 NOTE — PROGRESS NOTES
Progress Note - Hospitalist   Name: Rogelio Guerra 59 y.o. male I MRN: [id number]  Unit/Bed#: [facility] 528-01 I Date of Admission: 3/13/2025   Date of Service: 3/14/2025 I Hospital Day: 1    Assessment & Plan  Acute extensive venous thrombosis  Transfer from [facility] for altered mental status.  Recently hospitalized  at [facility] and discharged on 3/6/2025, he was diagnosed with sepsis and received treatment for pneumonia and completed antibiotics during his hospital stay.  Workup here in  ED with CT chest abdomen pelvis revealed extensive thrombosis of the IVC, bilateral iliac and left femoral veins  CT PE study- no PE  lower extremity venous Dopplers- bilateral- Evidence of non-occlusive deep venous thrombosis in the common femoral, femoral, and popliteal veins.  He has been placed on IV heparin gtt.  Continue IV heparin gtt for now  Discussed with IR to evaluate for clot removal versus thrombolysis- per IR today there is no plan for invasive procedure/thrombectomy  Was seen by shadia-onc. Because there are no invasive procedures planned, could consider switching to DOAC, however would need 2 consecutive PTT's in therapeutic range before switching over. At this point both of his PTTs have not been in range. Nursing reports he was a very difficult stick today and a midline had to be placed for blood work.   A script for eliquis was sent to [facility] for a price check, [facility] (not just for first month but they said its free ongoing).   Plan to start eliquis after the 2 PTTs is therapeutic range as recommended by heme/onc     Adrenal insufficiency (HCC)  History of adrenal sufficiency  Recent hospitalization notes reviewed patient on hydrocortisone 15 mg in the morning and 5 mg in the evening  Presently with hypotension we will have him on stress dose hydrocortisone 50 mg every 6 hours with plans to taper over the next 24 to 48 hours depending on hemodynamic  stability    Altered mental status  Presents with altered mental status, has hx Down syndrome and resides at [facility]  CT head- No acute intracranial abnormality. Chronic microangiopathic changes and moderate central greater than cortical volume loss  Infection workup including UA negative. Blood cultures negative at 24 hours  Avoid neurotoxins  Optimize metabolic parameters  Supportive cares  Spoke with nurse at [facility]. She states he has lived at the [facility] but more recently he was admitted to a nursing home for rehab after a hospitalization and it appears he developed an open sacral wound and had some decline during that time period  He is bedridden at baseline  He does eat a soft diet and takes his pills well at home  Updated contact info in EPIC- primary  over the weekend will be nurse Denise  Down syndrome  Patient with history of Down syndrome and diagnosis of early onset dementia  Supportive cares  Hypothyroidism  TSH 5.657 free T4 1.33  [facility] records document he was on levothyroxine 88 mcg daily but hospital records from recent admissions to [facility] in Jan and March both say he should be on 100 mcg so dose adjusted  Monitor TSH in 4 to 6 weeks and outpatient follow-up  Anxiety  Continue mirtazapine  Trazodone  Hypokalemia  K 3.0  Replete and recheck labs in AM  Sacral ulcer (HCC)  PTA sacral ulcer  Staff at [facility] states he developed this during a recent hospitalization/stay in nursing home for rehab  Being followed by wound care  Continue local care    VTE Pharmacologic Prophylaxis: VTE Score: 8 High Risk (Score >/= 5) - Pharmacological DVT Prophylaxis Ordered: heparin drip. Sequential Compression Devices Ordered.    Mobility:   Basic Mobility Inpatient Raw Score: 13  JH-HLM Goal: 4: Move to chair/commode  JH-HLM Achieved: 2: Bed activities/Dependent transfer  JH-HLM Goal NOT achieved. Continue with multidisciplinary rounding and encourage appropriate mobility to improve upon  -[facility] goals.    Patient Centered Rounds: I performed bedside rounds with nursing staff today.   Discussions with Specialists or Other Care Team Provider: spoke with IR, reviewed heme/onc notes    Education and Discussions with Family / Patient: Updated  (nurse at [facility]) via phone.    Current Length of Stay: 1 day(s)  Current Patient Status: Inpatient   Certification Statement: The patient will continue to require additional inpatient hospital stay due to remains on heparin  Discharge Plan: Anticipate discharge in 48-72 hrs to discharge location to be determined pending rehab evaluations.    Code Status: Level 1 - Full Code    Subjective   Patient mostly nonverbal. He is awake and alert, does not appear to be in distress    Objective :  Temp:  [97.5 °F (36.4 °C)-98.7 °F (37.1 °C)] 98.7 °F (37.1 °C)  HR:  [] 89  BP: ()/(43-79) 127/76  Resp:  [13-20] 20  SpO2:  [95 %-98 %] 98 %  O2 Device: None (Room air)  Nasal Cannula O2 Flow Rate (L/min):  [2 L/min] 2 L/min    Body mass index is 28.88 kg/m².     Input and Output Summary (last 24 hours):     Intake/Output Summary (Last 24 hours) at 3/14/2025 1717  Last data filed at 3/14/2025 1712  Gross per 24 hour   Intake 960 ml   Output 450 ml   Net 510 ml       Physical Exam  Vitals reviewed.   Constitutional:       General: He is not in acute distress.     Appearance: He is not diaphoretic.   HENT:      Head: Normocephalic and atraumatic.      Nose: Nose normal.      Mouth/Throat:      Pharynx: Oropharynx is clear.   Cardiovascular:      Rate and Rhythm: Normal rate.   Pulmonary:      Effort: Pulmonary effort is normal. No respiratory distress.   Abdominal:      General: There is no distension.      Palpations: Abdomen is soft.   Musculoskeletal:      Comments: Foreign LE contractures from Down syndrome   Skin:     General: Skin is warm and dry.      Comments: Multiple skin issues as documented in wound care note and in clinical images   Neurological:       Mental Status: He is alert.      Comments: nonverbal           Lines/Drains:  Lines/Drains/Airways       Active Status       Name Placement date Placement time Site Days    External Urinary Catheter 03/13/25  2100  -- less than 1                            Lab Results: I have reviewed the following results:   Results from last 7 days   Lab Units 03/13/25  0721   WBC Thousand/uL 5.01   HEMOGLOBIN g/dL 11.7*   HEMATOCRIT % 37.4   PLATELETS Thousands/uL 330   SEGS PCT % 53   LYMPHO PCT % 31   MONO PCT % 13*   EOS PCT % 1     Results from last 7 days   Lab Units 03/14/25  0557   SODIUM mmol/L 140   POTASSIUM mmol/L 3.0*   CHLORIDE mmol/L 107   CO2 mmol/L 28   BUN mg/dL 8   CREATININE mg/dL 0.52*   ANION GAP mmol/L 5   CALCIUM mg/dL 7.4*   ALBUMIN g/dL 2.2*   TOTAL BILIRUBIN mg/dL 0.28   ALK PHOS U/L 60   ALT U/L 29   AST U/L 21   GLUCOSE RANDOM mg/dL 126     Results from last 7 days   Lab Units 03/13/25  0723   INR  1.09             Results from last 7 days   Lab Units 03/13/25  1019 03/13/25  0723   LACTIC ACID mmol/L 0.9 2.3*   PROCALCITONIN ng/ml  --  0.16       Recent Cultures (last 7 days):   Results from last 7 days   Lab Units 03/13/25  0736 03/13/25  0723   BLOOD CULTURE  No Growth at 24 hrs. No Growth at 24 hrs.           Last 24 Hours Medication List:     Current Facility-Administered Medications:     acetaminophen (TYLENOL) tablet 650 mg, Q6H PRN    aluminum-magnesium hydroxide-simethicone (MAALOX) oral suspension 30 mL, Q6H PRN    atorvastatin (LIPITOR) tablet 20 mg, Daily    famotidine (PEPCID) tablet 20 mg, BID    heparin (porcine) 25,000 units in 0.45% NaCl 250 mL infusion (premix), Titrated, Last Rate: 16 Units/kg/hr (03/14/25 1312)    heparin (porcine) injection 2,600 Units, Q6H PRN    heparin (porcine) injection 5,200 Units, Q6H PRN    hydrocortisone (Solu-CORTEF) injection 50 mg, Q6H    hydrocortisone 1 % cream, BID    [START ON 3/15/2025] levothyroxine tablet 100 mcg, Early Morning     mirtazapine (REMERON) tablet 15 mg, HS    ondansetron (ZOFRAN) injection 4 mg, Q6H PRN    pantoprazole (PROTONIX) EC tablet 20 mg, Early Morning    polyethylene glycol (MIRALAX) packet 17 g, Daily    potassium chloride (Klor-Con M20) CR tablet 20 mEq, Once    white petrolatum-mineral oil (EUCERIN,HYDROCERIN) cream, TID PRN    Administrative Statements   Today, Patient Was Seen By: Mary Davis PA-C      **Please Note: This note may have been constructed using a voice recognition system.**

## 2025-03-14 NOTE — SPEECH THERAPY NOTE
Speech Language/Pathology  Speech-Language Pathology Bedside Swallow Evaluation      Patient Name: Rogelio Guerra    Today's Date: 3/14/2025     Problem List  Principal Problem:    Acute extensive venous thrombosis  Active Problems:    Altered mental status    Down syndrome    Hypothyroidism    Adrenal insufficiency (HCC)    Anxiety      Past Medical History  Past Medical History:   Diagnosis Date    Alzheimer disease (HCC)     Blepharitis     Diverticulitis     Down syndrome     GERD (gastroesophageal reflux disease)     Hyperlipemia     Hypotension     Hypothyroidism     Osteoarthritis        Past Surgical History  Past Surgical History:   Procedure Laterality Date    COLONOSCOPY      VASECTOMY         Summary   Pt presented with functional appearing oral and pharyngeal stage swallowing skills with [facility].  He often will shake his no and pull his head away then lean in for the tsp or the straw.  No gross oral residue w/ the cut up material.   Risk/s for Aspiration: h/o dysphagia     Recommended Diet: soft/level 3 diet and thin liquids   Recommended Form of Meds: crushed with puree -pt chews pills   Aspiration precautions and swallowing strategies: upright posture, only feed when fully alert, slow rate of feeding, small bites/sips, and alternating bites and sips  Other Recommendations: Continue frequent oral care, please feed pt.  Will follow briefly for any needed changes. The pt had a recent hospitalization in a different hospital. ?need for MBS.  Will address next week if needed though no overt s/s at the bedside.         Current Medical Status  Pt is a 59 y.o. male who presented to [facility] with change in MS.  Recent pna at a different hospital.     Current Precautions:   Fall  Aspiration    Allergies:  No known food allergies    Past medical history:  Please see H&P for details    Special Studies:  CT head-No acute intracranial abnormality.      Social/Education/Vocational Hx:  Pt lives in group home    Swallow Information   Current Risks for Dysphagia & Aspiration: known history of dysphagia and AMS  Current Symptoms/Concerns:  h/o recent pna  Current Diet: NPO   Baseline Diet:  regular quarter sized with thin per the sheet from [facility]      Baseline Assessment   Behavior/Cognition: alert  Speech/Language Status: not able to to follow commands and limited verbal output  Patient Positioning: upright in bed  Pain Status/Interventions/Response to Interventions:   No report of or nonverbal indications of pain.       Swallow Mechanism Exam  Facial: symmetrical  Labial: unable to test 2/2 limited command following  Lingual: unable to test 2/2 limited command following  Velum: unable to visualize  Mandible:  decreased ROM  Dentition:  partial natural  Vocal quality: pt with periodical vocalizations but is nonverbal    Volitional Cough: unable to initiate volitional cough   Respiratory Status: on RA      Consistencies Assessed and Performance   Consistencies Administered: thin liquids, puree, mechanical soft solids, and soft solids  Materials administered included cookies, crackers broken into pieces    Oral Stage: WFL  Mastication was slow but functional for all trials.  He cleared the oral cavity well.     Pharyngeal Stage: WFL  Mult swallows per all trials    Esophageal Concerns: none reported    Strategies and Efficacy: -pt needs encouragement for po    Summary and Recommendations (see above)    Results Reviewed with: patient and RN, MD    Treatment Recommended: as able      Frequency of treatment: as able     Patient Stated Goal:-    Dysphagia LTG  -Patient will demonstrate safe and effective oral intake (without overt s/s significant oral/pharyngeal dysphagia including s/s penetration or aspiration) for the highest appropriate diet level.     Short Term Goals:  -  -Pt will tolerate Dysphagia 3/advanced (dental soft) diet and  thin liquid with no  significant s/s oral or pharyngeal dysphagia across 1-3 diagnostic session/s.    -Patient will tolerate trials of upgraded food and/or liquid texture with no significant s/s of oral or pharyngeal dysphagia including aspiration across 1-3 diagnostic sessions     -Patient will comply with a Video/Modified Barium Swallow study for more complete assessment of swallowing anatomy/physiology/aspiration risk and to assess efficacy of treatment techniques so as to best guide treatment plan    Speech Therapy Prognosis   Prognosis: fair    Prognosis Considerations: prior medical history and cognitive status

## 2025-03-14 NOTE — QUICK NOTE
Interventional Radiology Quick Note    59 year old male with CT findings of extensive thrombus of the IVC, bilateral iliac and left femoral veins.  PE study was performed with negative results for PE. Lower extremity Dopplers resulting evidence of right nonocclusive DVT in common femoral.  Femoral and popliteal veins.  Evidence of nonocclusive DVT noted in common femoral, popliteal and posterior tibial veins.    Upon further discussion/review, recommendation for continued anticoagulation therapy with no plan for invasive procedures from interventional radiology.    Discussed with hematology and SLIM    Please do not hesitate to contact interventional radiology for question/concerns.    ASHKAN Potter

## 2025-03-14 NOTE — CASE MANAGEMENT
Case Management Assessment & Discharge Planning Note    Patient name Rogelio Guerra  Location [facility] 528-01 MRN [id number]  : 1965 Date 3/14/2025       Current Admission Date: 3/13/2025  Current Admission Diagnosis:Acute extensive venous thrombosis   Patient Active Problem List    Diagnosis Date Noted Date Diagnosed    Altered mental status 2025     Down syndrome 2025     Hypothyroidism 2025     Adrenal insufficiency (HCC) 2025     Acute extensive venous thrombosis 2025     Anxiety 2025       LOS (days): 1  Geometric Mean LOS (GMLOS) (days): 3.4  Days to GMLOS:2.4     OBJECTIVE:    Risk of Unplanned Readmission Score: 9.48         Current admission status: Inpatient       Preferred Pharmacy:   [facility] [place], [place] - [address]  [address]  [facility] [address]  Phone: [phone] Fax: [phone]    [place], [place] - [id number] [facility] [address]  Phone: [phone] Fax: [phone]    [facility] [place] [place] - [address]  [address]  [facility] [place] [address]  Phone: [phone] Fax: [phone]    Primary Care Provider: Jared Tee MD    Primary Insurance: MEDICARE  Secondary Insurance: [place] MEDICAL [place]IST[place]    ASSESSMENT:  Active Health Care Proxies    There are no active Health Care Proxies on file.       Advance Directives  Primary Contact: Haleigh Winn [phone]         Readmission Root Cause  30 Day Readmission: No    Patient Information  Admitted from:: Facility ([facility]  Mental Status: Other (Comment) (awake, nonverbal)  During Assessment patient was accompanied by: Not accompanied during assessment  Assessment information provided by:: Other - please comment (RN, chart, left VM for Haleigh)  Primary Caregiver: Other (Comment)  Caregiver's Name:: Haleigh--Tatum MCBRIDE  Caregiver's Relationship to  Patient:: Facility Staff  Caregiver's Telephone Number:: [phone]  Support Systems: Other (Comment) (group home)  County of Residence: [place]  What city do you live in?: [place]  Type of Current Residence: Group home  Living Arrangements: Lives in Facility  Is patient a ?: No    Activities of Daily Living Prior to Admission  Functional Status: Total dependent  Completes ADLs independently?: No  Level of ADL dependence: Total Dependent  Ambulates independently?: No  Level of ambulatory dependence: Total Dependent  Does patient use assisted devices?: Yes  Assisted Devices (DME) used: Chelle lift  Does patient currently own DME?: No         Patient Information Continued  Income Source: SSI/SSD  Does patient have prescription coverage?: Yes  Can the patient afford their medications and any related supplies (such as glucometers or test strips)?: Yes  Does patient receive dialysis treatments?: No  Does patient have a history of substance abuse?: No  Does patient have a history of Mental Health Diagnosis?: Yes (anxiety)  Has patient received inpatient treatment related to mental health in the last 2 years?: No         Means of Transportation  Means of Transport to Appts:: Other (Comment) (AMBULANCE)          DISCHARGE DETAILS:    Discharge planning discussed with:: left  for RN at  [facility]  [place] of Choice: Yes     CM contacted family/caregiver?: No- see comments             Contacts  Patient Contacts: Haleigh Winn  Relationship to Patient:: Treatment Provider  Contact Method: Phone  Phone Number: [phone]  Reason/Outcome: Continuity of Care, Emergency Contact, Discharge Planning              Other Referral/Resources/Interventions Provided:  Referral Comments: Pt resides at [facility]    Would you like to participate in our [facility] service program?  : No - Declined    Treatment Team Recommendation: Facility Return  Discharge Destination Plan:: Facility Return                                          Additional Comments: 60yo Down's syndrome male pt. admitted from [facility] with change in mental status, hypotension and acute DVT.  NO PE.  He is normally total care, uses Chuckie lift, must be fed. On Heparin gtt now. Also has large sacral wound  and wound on scrotum. Called [facility] Winn at [phone] and left  to call back Suzanne Myers RN CM.

## 2025-03-14 NOTE — DISCHARGE INSTR - OTHER ORDERS
Skin Care Plan:  1-P-500 Low Air Loss Mattress ordered for patient   2-Turn/reposition q2h or when medically stable for pressure re-distribution on skin. Utilize ATR turning and repositioning system for patient.  3-Elevate heels to offload pressure. Apply offloading boots to b/l feet.   4-Moisturize skin daily with skin nourishing cream  5-Ehob cushion in chair when out of bed.  6-Cleanse B/L Heels with soap and water. Pat dry. Apply Silicone Border Foam (Mepilex) to areas. Mika with P for Prevention and change every 3 days or PRN soilage/displacement. Peel back and inspect Q-shift.  7-Sacro-Buttocks: cleanse with NSS and pat dry. Apply silver alginate (Melgisorb Ag) to wound bed and cover with a silicone bordered foam dressing. Mika with T for treatment and change every other day or PRN soilage/displacement.   8-Apply calazime cream to Posterior Scrotum and B/L Ischium TID and PRN  9-Paint Right 5th toe eschar with betadine daily and leave CARSON.  10-Apply hydraguard to b/l medial great toes and left medial foot DTPIs BID and PRN

## 2025-03-14 NOTE — OCCUPATIONAL THERAPY NOTE
Occupational Therapy cx        Patient Name: Rogelio Guerra  Today's Date: 3/14/2025     03/14/25 0745   OT Last Visit   OT Visit Date 03/14/25   Note Type   Note type Evaluation   Cancel Reasons Medical status   Additional Comments Pt w extensive DVT, is from a group home where it appears per chart review he may be non-mobile as well. Will hold initial eval until further medical course and PLOF is determined.    Spoke w facility this afternoon who reports pt is dependent/jenny @ baseline. OT to d/c.          Prachi Castañeda, KARLOS, OTR/L

## 2025-03-14 NOTE — ASSESSMENT & PLAN NOTE
PTA sacral ulcer  Staff at [facility] states he developed this during a recent hospitalization/stay in nursing home for rehab  Being followed by wound care  Continue local care

## 2025-03-15 LAB
ANION GAP SERPL CALCULATED.3IONS-SCNC: 8 MMOL/L (ref 4–13)
APTT PPP: 49 SECONDS (ref 23–34)
BUN SERPL-MCNC: 7 MG/DL (ref 5–25)
CALCIUM SERPL-MCNC: 7.8 MG/DL (ref 8.4–10.2)
CHLORIDE SERPL-SCNC: 107 MMOL/L (ref 96–108)
CO2 SERPL-SCNC: 26 MMOL/L (ref 21–32)
CREAT SERPL-MCNC: 0.5 MG/DL (ref 0.6–1.3)
ERYTHROCYTE [DISTWIDTH] IN BLOOD BY AUTOMATED COUNT: 18.9 % (ref 11.6–15.1)
GFR SERPL CREATININE-BSD FRML MDRD: 116 ML/MIN/1.73SQ M
GLUCOSE SERPL-MCNC: 143 MG/DL (ref 65–140)
HCT VFR BLD AUTO: 33.9 % (ref 36.5–49.3)
HGB BLD-MCNC: 10.6 G/DL (ref 12–17)
MCH RBC QN AUTO: 30.1 PG (ref 26.8–34.3)
MCHC RBC AUTO-ENTMCNC: 31.3 G/DL (ref 31.4–37.4)
MCV RBC AUTO: 96 FL (ref 82–98)
PLATELET # BLD AUTO: 326 THOUSANDS/UL (ref 149–390)
PMV BLD AUTO: 9.8 FL (ref 8.9–12.7)
POTASSIUM SERPL-SCNC: 3 MMOL/L (ref 3.5–5.3)
RBC # BLD AUTO: 3.52 MILLION/UL (ref 3.88–5.62)
SODIUM SERPL-SCNC: 141 MMOL/L (ref 135–147)
WBC # BLD AUTO: 6.65 THOUSAND/UL (ref 4.31–10.16)

## 2025-03-15 PROCEDURE — 99232 SBSQ HOSP IP/OBS MODERATE 35: CPT | Performed by: INTERNAL MEDICINE

## 2025-03-15 PROCEDURE — 85027 COMPLETE CBC AUTOMATED: CPT | Performed by: FAMILY MEDICINE

## 2025-03-15 PROCEDURE — 85730 THROMBOPLASTIN TIME PARTIAL: CPT | Performed by: INTERNAL MEDICINE

## 2025-03-15 PROCEDURE — 80048 BASIC METABOLIC PNL TOTAL CA: CPT | Performed by: FAMILY MEDICINE

## 2025-03-15 RX ORDER — POTASSIUM CHLORIDE 14.9 MG/ML
20 INJECTION INTRAVENOUS
Status: COMPLETED | OUTPATIENT
Start: 2025-03-15 | End: 2025-03-15

## 2025-03-15 RX ORDER — POTASSIUM CHLORIDE 29.8 MG/ML
40 INJECTION INTRAVENOUS ONCE
Status: DISCONTINUED | OUTPATIENT
Start: 2025-03-15 | End: 2025-03-15

## 2025-03-15 RX ORDER — HYDROCORTISONE SODIUM SUCCINATE 100 MG/2ML
50 INJECTION INTRAMUSCULAR; INTRAVENOUS EVERY 8 HOURS SCHEDULED
Status: DISCONTINUED | OUTPATIENT
Start: 2025-03-15 | End: 2025-03-16

## 2025-03-15 RX ADMIN — HYDROCORTISONE 1 APPLICATION: 1 CREAM TOPICAL at 09:46

## 2025-03-15 RX ADMIN — HYDROCORTISONE SODIUM SUCCINATE 50 MG: 100 INJECTION, POWDER, FOR SOLUTION INTRAMUSCULAR; INTRAVENOUS at 13:38

## 2025-03-15 RX ADMIN — HEPARIN SODIUM 2600 UNITS: 1000 INJECTION INTRAVENOUS; SUBCUTANEOUS at 05:51

## 2025-03-15 RX ADMIN — POTASSIUM CHLORIDE 20 MEQ: 14.9 INJECTION, SOLUTION INTRAVENOUS at 09:33

## 2025-03-15 RX ADMIN — HYDROCORTISONE: 1 CREAM TOPICAL at 17:36

## 2025-03-15 RX ADMIN — APIXABAN 10 MG: 5 TABLET, FILM COATED ORAL at 09:45

## 2025-03-15 RX ADMIN — POLYETHYLENE GLYCOL 3350 17 G: 17 POWDER, FOR SOLUTION ORAL at 09:46

## 2025-03-15 RX ADMIN — ATORVASTATIN CALCIUM 20 MG: 20 TABLET, FILM COATED ORAL at 09:46

## 2025-03-15 RX ADMIN — APIXABAN 10 MG: 5 TABLET, FILM COATED ORAL at 17:35

## 2025-03-15 RX ADMIN — FAMOTIDINE 20 MG: 20 TABLET, FILM COATED ORAL at 09:45

## 2025-03-15 RX ADMIN — POTASSIUM CHLORIDE 20 MEQ: 14.9 INJECTION, SOLUTION INTRAVENOUS at 11:29

## 2025-03-15 RX ADMIN — FAMOTIDINE 20 MG: 20 TABLET, FILM COATED ORAL at 17:35

## 2025-03-15 RX ADMIN — LEVOTHYROXINE SODIUM 100 MCG: 100 TABLET ORAL at 05:51

## 2025-03-15 RX ADMIN — HYDROCORTISONE SODIUM SUCCINATE 50 MG: 100 INJECTION, POWDER, FOR SOLUTION INTRAMUSCULAR; INTRAVENOUS at 21:50

## 2025-03-15 RX ADMIN — PANTOPRAZOLE SODIUM 20 MG: 20 TABLET, DELAYED RELEASE ORAL at 05:51

## 2025-03-15 RX ADMIN — MIRTAZAPINE 15 MG: 15 TABLET, FILM COATED ORAL at 21:47

## 2025-03-15 RX ADMIN — HYDROCORTISONE SODIUM SUCCINATE 50 MG: 100 INJECTION, POWDER, FOR SOLUTION INTRAMUSCULAR; INTRAVENOUS at 04:24

## 2025-03-15 NOTE — ASSESSMENT & PLAN NOTE
TSH 5.657 free T4 1.33  [facility] records document he was on levothyroxine 88 mcg daily but hospital records from recent admissions to [facility] in Jan and March both say he should be on 100 mcg so dose adjusted  Monitor TSH in 4 to 6 weeks and outpatient follow-up

## 2025-03-15 NOTE — ASSESSMENT & PLAN NOTE
Presents with altered mental status, has hx Down syndrome and resides at nursing home  CT head- No acute intracranial abnormality. Chronic microangiopathic changes and moderate central greater than cortical volume loss  Infection workup including UA negative. Blood cultures negative at 24 hours  Avoid neurotoxins  Optimize metabolic parameters  Supportive cares  Previous provider spoke with nurse at nursing home. She states he has lived at the nursing home but more recently he was admitted to a nursing home for rehab after a hospitalization and it appears he developed an open sacral wound and had some decline during that time period  He is bedridden at baseline  He does eat a soft diet and takes his pills well at home  Updated contact info in EPIC- primary  over the weekend will be nurse Walker

## 2025-03-15 NOTE — ASSESSMENT & PLAN NOTE
PTA sacral ulcer  Staff at [facility] states he developed this during a recent hospitalization/stay in nursing home for rehab  Being followed by wound care  Continue local care

## 2025-03-15 NOTE — ASSESSMENT & PLAN NOTE
History of adrenal sufficiency  Recent hospitalization notes reviewed patient on hydrocortisone 15 mg in the morning and 5 mg in the evening  Presently with hypotension we will have him on stress dose hydrocortisone 50 mg every 6 hours   Decrease to 50 mg every 8 hours with plans to taper over the next 24 to 48 hours depending on hemodynamic stability

## 2025-03-15 NOTE — PROGRESS NOTES
Progress Note - Hospitalist   Name: Rogelio Guerra 59 y.o. male I MRN: [id number]  Unit/Bed#: [facility] 528-01 I Date of Admission: 3/13/2025   Date of Service: 3/15/2025 I Hospital Day: 2    Assessment & Plan  Acute extensive venous thrombosis  Transfer from [facility] for altered mental status.  Recently hospitalized  at [facility] and discharged on 3/6/2025, he was diagnosed with sepsis and received treatment for pneumonia and completed antibiotics during his hospital stay.  Workup here in  ED with CT chest abdomen pelvis revealed extensive thrombosis of the IVC, bilateral iliac and left femoral veins  CT PE study- no PE  lower extremity venous Dopplers- bilateral- Evidence of non-occlusive deep venous thrombosis in the common femoral, femoral, and popliteal veins.  He has been placed on IV heparin gtt.  Discussed with IR to evaluate for clot removal versus thrombolysis- per IR  there is no plan for invasive procedure/thrombectomy  Was seen by heme-onc. Because there are no invasive procedures planned, could consider switching to DOAC lifelong   A script for eliquis was sent to [facility] for a price check, called down to [facility] and script is free (not just for first month but they said its free ongoing).   Start Eliquis today and discontinue heparin drip    Adrenal insufficiency (HCC)  History of adrenal sufficiency  Recent hospitalization notes reviewed patient on hydrocortisone 15 mg in the morning and 5 mg in the evening  Presently with hypotension we will have him on stress dose hydrocortisone 50 mg every 6 hours   Decrease to 50 mg every 8 hours with plans to taper over the next 24 to 48 hours depending on hemodynamic stability    Altered mental status  Presents with altered mental status, has hx Down syndrome and resides at [facility]  CT head- No acute intracranial abnormality. Chronic microangiopathic changes and moderate central greater than cortical volume loss  Infection workup including UA  negative. Blood cultures negative at 24 hours  Avoid neurotoxins  Optimize metabolic parameters  Supportive cares  Previous provider spoke with nurse at [facility]. She states he has lived at the [facility] but more recently he was admitted to a nursing home for rehab after a hospitalization and it appears he developed an open sacral wound and had some decline during that time period  He is bedridden at baseline  He does eat a soft diet and takes his pills well at home  Updated contact info in EPIC- primary  over the weekend will be nurse Denise  Sacral ulcer (HCC)  PTA sacral ulcer  Staff at [facility] states he developed this during a recent hospitalization/stay in nursing home for rehab  Being followed by wound care  Continue local care  Down syndrome  Patient with history of Down syndrome and diagnosis of early onset dementia  Supportive cares  Hypothyroidism  TSH 5.657 free T4 1.33  [facility] records document he was on levothyroxine 88 mcg daily but hospital records from recent admissions to [facility] in Jan and March both say he should be on 100 mcg so dose adjusted  Monitor TSH in 4 to 6 weeks and outpatient follow-up  Anxiety  Continue mirtazapine  Trazodone  Hypokalemia  Replace potassium    VTE Pharmacologic Prophylaxis: VTE Score: 8 High Risk (Score >/= 5) - Pharmacological DVT Prophylaxis Ordered: heparin drip. Sequential Compression Devices Ordered.    Mobility:   Basic Mobility Inpatient Raw Score: 13  JH-HLM Goal: 4: Move to chair/commode  JH-HLM Achieved: 2: Bed activities/Dependent transfer  JH-HLM Goal NOT achieved. Continue with multidisciplinary rounding and encourage appropriate mobility to improve upon JH-HLM goals.    Patient Centered Rounds: I performed bedside rounds with nursing staff today.   Discussions with Specialists or Other Care Team Provider: Nursing    Current Length of Stay: 2 day(s)  Current Patient Status: Inpatient   Certification Statement: The patient will continue to  require additional inpatient hospital stay due to management of acute DVT  Discharge Plan: Anticipate discharge in 48 hrs to home.    Code Status: Level 1 - Full Code    Subjective   Patient seen and examined  Comfortable sleeping in bed  Per nursing patient did not sleep last night  Arousable, nonverbal      Objective :  Temp:  [98 °F (36.7 °C)-98.7 °F (37.1 °C)] 98.4 °F (36.9 °C)  HR:  [62-95] 62  BP: ()/(50-95) 105/66  Resp:  [14-20] 14  SpO2:  [97 %-99 %] 97 %  O2 Device: None (Room air)    Body mass index is 28.88 kg/m².     Input and Output Summary (last 24 hours):     Intake/Output Summary (Last 24 hours) at 3/15/2025 0918  Last data filed at 3/14/2025 1901  Gross per 24 hour   Intake 960 ml   Output 100 ml   Net 860 ml       Physical Exam  Patient is comfortab sleeping in bed, arousable to verbal stimuli  Mostly nonverbal  Lung clear to auscultation bilateral anteriorly  Heart positive S1-S2 no murmur  Abdomen soft nontender  Bilateral lower extremity edema    Lines/Drains:  Lines/Drains/Airways       Active Status       Name Placement date Placement time Site Days    External Urinary Catheter 03/13/25  2100  -- 1                            Lab Results: I have reviewed the following results:   Results from last 7 days   Lab Units 03/15/25  0316 03/13/25  0721   WBC Thousand/uL 6.65 5.01   HEMOGLOBIN g/dL 10.6* 11.7*   HEMATOCRIT % 33.9* 37.4   PLATELETS Thousands/uL 326 330   SEGS PCT %  --  53   LYMPHO PCT %  --  31   MONO PCT %  --  13*   EOS PCT %  --  1     Results from last 7 days   Lab Units 03/15/25  0316 03/14/25  0557   SODIUM mmol/L 141 140   POTASSIUM mmol/L 3.0* 3.0*   CHLORIDE mmol/L 107 107   CO2 mmol/L 26 28   BUN mg/dL 7 8   CREATININE mg/dL 0.50* 0.52*   ANION GAP mmol/L 8 5   CALCIUM mg/dL 7.8* 7.4*   ALBUMIN g/dL  --  2.2*   TOTAL BILIRUBIN mg/dL  --  0.28   ALK PHOS U/L  --  60   ALT U/L  --  29   AST U/L  --  21   GLUCOSE RANDOM mg/dL 143* 126     Results from last 7 days   Lab  Units 03/13/25  0723   INR  1.09             Results from last 7 days   Lab Units 03/13/25  1019 03/13/25  0723   LACTIC ACID mmol/L 0.9 2.3*   PROCALCITONIN ng/ml  --  0.16       Recent Cultures (last 7 days):   Results from last 7 days   Lab Units 03/13/25  0736 03/13/25  0723   BLOOD CULTURE  No Growth at 24 hrs. No Growth at 24 hrs.       Imaging Results Review: No pertinent imaging studies reviewed.  Other Study Results Review: No additional pertinent studies reviewed.    Last 24 Hours Medication List:     Current Facility-Administered Medications:     acetaminophen (TYLENOL) tablet 650 mg, Q6H PRN    aluminum-magnesium hydroxide-simethicone (MAALOX) oral suspension 30 mL, Q6H PRN    atorvastatin (LIPITOR) tablet 20 mg, Daily    famotidine (PEPCID) tablet 20 mg, BID    heparin (porcine) 25,000 units in 0.45% NaCl 250 mL infusion (premix), Titrated, Last Rate: 20 Units/kg/hr (03/15/25 0556)    heparin (porcine) injection 2,600 Units, Q6H PRN    heparin (porcine) injection 5,200 Units, Q6H PRN    hydrocortisone (Solu-CORTEF) injection 50 mg, Q6H    hydrocortisone 1 % cream, BID    levothyroxine tablet 100 mcg, Early Morning    mirtazapine (REMERON) tablet 15 mg, HS    ondansetron (ZOFRAN) injection 4 mg, Q6H PRN    pantoprazole (PROTONIX) EC tablet 20 mg, Early Morning    polyethylene glycol (MIRALAX) packet 17 g, Daily    potassium chloride 20 mEq IVPB (premix), Q2H    white petrolatum-mineral oil (EUCERIN,HYDROCERIN) cream, TID PRN    Administrative Statements   Today, Patient Was Seen By: Hasmukh Carney DO      **Please Note: This note may have been constructed using a voice recognition system.**

## 2025-03-15 NOTE — ASSESSMENT & PLAN NOTE
Transfer from halfway/[facility] for altered mental status.  Recently hospitalized  at [facility] and discharged on 3/6/2025, he was diagnosed with sepsis and received treatment for pneumonia and completed antibiotics during his hospital stay.  Workup here in  ED with CT chest abdomen pelvis revealed extensive thrombosis of the IVC, bilateral iliac and left femoral veins  CT PE study- no PE  lower extremity venous Dopplers- bilateral- Evidence of non-occlusive deep venous thrombosis in the common femoral, femoral, and popliteal veins.  He has been placed on IV heparin gtt.  Discussed with IR to evaluate for clot removal versus thrombolysis- per IR  there is no plan for invasive procedure/thrombectomy  Was seen by heme-onc. Because there are no invasive procedures planned, could consider switching to DOAC lifelong   A script for eliquis was sent to [facility] for a price check, called down to [facility] and script is free (not just for first month but they said its free ongoing).   Start Eliquis today and discontinue heparin drip

## 2025-03-16 LAB
ABO GROUP BLD: NORMAL
ABO GROUP BLD: NORMAL
ANION GAP SERPL CALCULATED.3IONS-SCNC: 5 MMOL/L (ref 4–13)
ANION GAP SERPL CALCULATED.3IONS-SCNC: 6 MMOL/L (ref 4–13)
APTT PPP: 30 SECONDS (ref 23–34)
BASE EXCESS BLDA CALC-SCNC: 3 MMOL/L (ref -2–3)
BASOPHILS # BLD AUTO: 0.02 THOUSANDS/ÂΜL (ref 0–0.1)
BASOPHILS NFR BLD AUTO: 0 % (ref 0–1)
BLD GP AB SCN SERPL QL: NEGATIVE
BUN SERPL-MCNC: 7 MG/DL (ref 5–25)
BUN SERPL-MCNC: 8 MG/DL (ref 5–25)
CA-I BLD-SCNC: 1.23 MMOL/L (ref 1.12–1.32)
CALCIUM SERPL-MCNC: 7.4 MG/DL (ref 8.4–10.2)
CALCIUM SERPL-MCNC: 7.7 MG/DL (ref 8.4–10.2)
CHLORIDE SERPL-SCNC: 106 MMOL/L (ref 96–108)
CHLORIDE SERPL-SCNC: 107 MMOL/L (ref 96–108)
CO2 SERPL-SCNC: 28 MMOL/L (ref 21–32)
CO2 SERPL-SCNC: 28 MMOL/L (ref 21–32)
CREAT SERPL-MCNC: 0.53 MG/DL (ref 0.6–1.3)
CREAT SERPL-MCNC: 0.58 MG/DL (ref 0.6–1.3)
EOSINOPHIL # BLD AUTO: 0.04 THOUSAND/ÂΜL (ref 0–0.61)
EOSINOPHIL NFR BLD AUTO: 1 % (ref 0–6)
ERYTHROCYTE [DISTWIDTH] IN BLOOD BY AUTOMATED COUNT: 19.2 % (ref 11.6–15.1)
GFR SERPL CREATININE-BSD FRML MDRD: 110 ML/MIN/1.73SQ M
GFR SERPL CREATININE-BSD FRML MDRD: 114 ML/MIN/1.73SQ M
GLUCOSE SERPL-MCNC: 124 MG/DL (ref 65–140)
GLUCOSE SERPL-MCNC: 124 MG/DL (ref 65–140)
GLUCOSE SERPL-MCNC: 126 MG/DL (ref 65–140)
GLUCOSE SERPL-MCNC: 144 MG/DL (ref 65–140)
HCO3 BLDA-SCNC: 28.4 MMOL/L (ref 24–30)
HCT VFR BLD AUTO: 30.9 % (ref 36.5–49.3)
HCT VFR BLD CALC: 30 % (ref 36.5–49.3)
HGB BLD-MCNC: 9.6 G/DL (ref 12–17)
HGB BLDA-MCNC: 10.2 G/DL (ref 12–17)
IMM GRANULOCYTES # BLD AUTO: 0.04 THOUSAND/UL (ref 0–0.2)
IMM GRANULOCYTES NFR BLD AUTO: 1 % (ref 0–2)
LACTATE SERPL-SCNC: 1.3 MMOL/L (ref 0.5–2)
LYMPHOCYTES # BLD AUTO: 1.47 THOUSANDS/ÂΜL (ref 0.6–4.47)
LYMPHOCYTES NFR BLD AUTO: 22 % (ref 14–44)
MAGNESIUM SERPL-MCNC: 1.8 MG/DL (ref 1.9–2.7)
MCH RBC QN AUTO: 30.8 PG (ref 26.8–34.3)
MCHC RBC AUTO-ENTMCNC: 31.1 G/DL (ref 31.4–37.4)
MCV RBC AUTO: 99 FL (ref 82–98)
MONOCYTES # BLD AUTO: 0.67 THOUSAND/ÂΜL (ref 0.17–1.22)
MONOCYTES NFR BLD AUTO: 10 % (ref 4–12)
NEUTROPHILS # BLD AUTO: 4.35 THOUSANDS/ÂΜL (ref 1.85–7.62)
NEUTS SEG NFR BLD AUTO: 66 % (ref 43–75)
NRBC BLD AUTO-RTO: 0 /100 WBCS
PCO2 BLD: 30 MMOL/L (ref 21–32)
PCO2 BLD: 45.7 MM HG (ref 42–50)
PH BLD: 7.4 [PH] (ref 7.3–7.4)
PLATELET # BLD AUTO: 279 THOUSANDS/UL (ref 149–390)
PMV BLD AUTO: 9.8 FL (ref 8.9–12.7)
PO2 BLD: 23 MM HG (ref 35–45)
POTASSIUM BLD-SCNC: 3.4 MMOL/L (ref 3.5–5.3)
POTASSIUM SERPL-SCNC: 3.3 MMOL/L (ref 3.5–5.3)
POTASSIUM SERPL-SCNC: 3.3 MMOL/L (ref 3.5–5.3)
RBC # BLD AUTO: 3.12 MILLION/UL (ref 3.88–5.62)
RH BLD: POSITIVE
RH BLD: POSITIVE
SAO2 % BLD FROM PO2: 39 % (ref 60–85)
SODIUM BLD-SCNC: 142 MMOL/L (ref 136–145)
SODIUM SERPL-SCNC: 140 MMOL/L (ref 135–147)
SODIUM SERPL-SCNC: 140 MMOL/L (ref 135–147)
SPECIMEN EXPIRATION DATE: NORMAL
SPECIMEN SOURCE: ABNORMAL
WBC # BLD AUTO: 6.59 THOUSAND/UL (ref 4.31–10.16)

## 2025-03-16 PROCEDURE — 82803 BLOOD GASES ANY COMBINATION: CPT

## 2025-03-16 PROCEDURE — 80048 BASIC METABOLIC PNL TOTAL CA: CPT | Performed by: INTERNAL MEDICINE

## 2025-03-16 PROCEDURE — 84295 ASSAY OF SERUM SODIUM: CPT

## 2025-03-16 PROCEDURE — 99232 SBSQ HOSP IP/OBS MODERATE 35: CPT | Performed by: FAMILY MEDICINE

## 2025-03-16 PROCEDURE — 84132 ASSAY OF SERUM POTASSIUM: CPT

## 2025-03-16 PROCEDURE — 86850 RBC ANTIBODY SCREEN: CPT | Performed by: NURSE PRACTITIONER

## 2025-03-16 PROCEDURE — 83735 ASSAY OF MAGNESIUM: CPT | Performed by: INTERNAL MEDICINE

## 2025-03-16 PROCEDURE — 83605 ASSAY OF LACTIC ACID: CPT | Performed by: NURSE PRACTITIONER

## 2025-03-16 PROCEDURE — NC001 PR NO CHARGE: Performed by: NURSE PRACTITIONER

## 2025-03-16 PROCEDURE — 82330 ASSAY OF CALCIUM: CPT

## 2025-03-16 PROCEDURE — 85014 HEMATOCRIT: CPT

## 2025-03-16 PROCEDURE — 85025 COMPLETE CBC W/AUTO DIFF WBC: CPT | Performed by: NURSE PRACTITIONER

## 2025-03-16 PROCEDURE — 83735 ASSAY OF MAGNESIUM: CPT | Performed by: STUDENT IN AN ORGANIZED HEALTH CARE EDUCATION/TRAINING PROGRAM

## 2025-03-16 PROCEDURE — 86900 BLOOD TYPING SEROLOGIC ABO: CPT | Performed by: NURSE PRACTITIONER

## 2025-03-16 PROCEDURE — 80048 BASIC METABOLIC PNL TOTAL CA: CPT | Performed by: NURSE PRACTITIONER

## 2025-03-16 PROCEDURE — 86901 BLOOD TYPING SEROLOGIC RH(D): CPT | Performed by: NURSE PRACTITIONER

## 2025-03-16 PROCEDURE — 82947 ASSAY GLUCOSE BLOOD QUANT: CPT

## 2025-03-16 PROCEDURE — 82948 REAGENT STRIP/BLOOD GLUCOSE: CPT

## 2025-03-16 PROCEDURE — 85730 THROMBOPLASTIN TIME PARTIAL: CPT | Performed by: NURSE PRACTITIONER

## 2025-03-16 RX ORDER — LANOLIN ALCOHOL/MO/W.PET/CERES
800 CREAM (GRAM) TOPICAL 2 TIMES DAILY
Status: COMPLETED | OUTPATIENT
Start: 2025-03-16 | End: 2025-03-16

## 2025-03-16 RX ORDER — NALOXONE HYDROCHLORIDE 0.4 MG/ML
0.1 INJECTION, SOLUTION INTRAMUSCULAR; INTRAVENOUS; SUBCUTANEOUS ONCE
Status: DISCONTINUED | OUTPATIENT
Start: 2025-03-16 | End: 2025-03-16

## 2025-03-16 RX ORDER — POTASSIUM CHLORIDE 1500 MG/1
40 TABLET, EXTENDED RELEASE ORAL ONCE
Status: COMPLETED | OUTPATIENT
Start: 2025-03-16 | End: 2025-03-16

## 2025-03-16 RX ORDER — NALOXONE HYDROCHLORIDE 0.4 MG/ML
1 INJECTION, SOLUTION INTRAMUSCULAR; INTRAVENOUS; SUBCUTANEOUS ONCE
Status: COMPLETED | OUTPATIENT
Start: 2025-03-16 | End: 2025-03-16

## 2025-03-16 RX ORDER — HYDROCORTISONE SODIUM SUCCINATE 100 MG/2ML
50 INJECTION INTRAMUSCULAR; INTRAVENOUS EVERY 12 HOURS SCHEDULED
Status: DISCONTINUED | OUTPATIENT
Start: 2025-03-16 | End: 2025-03-17

## 2025-03-16 RX ORDER — OXYCODONE HYDROCHLORIDE 5 MG/1
5 TABLET ORAL EVERY 4 HOURS PRN
Refills: 0 | Status: DISCONTINUED | OUTPATIENT
Start: 2025-03-16 | End: 2025-03-16

## 2025-03-16 RX ADMIN — LEVOTHYROXINE SODIUM 100 MCG: 100 TABLET ORAL at 05:26

## 2025-03-16 RX ADMIN — FAMOTIDINE 20 MG: 20 TABLET, FILM COATED ORAL at 19:24

## 2025-03-16 RX ADMIN — POTASSIUM CHLORIDE 40 MEQ: 1500 TABLET, EXTENDED RELEASE ORAL at 07:56

## 2025-03-16 RX ADMIN — APIXABAN 10 MG: 5 TABLET, FILM COATED ORAL at 19:24

## 2025-03-16 RX ADMIN — OXYCODONE HYDROCHLORIDE 5 MG: 5 TABLET ORAL at 16:45

## 2025-03-16 RX ADMIN — MIRTAZAPINE 15 MG: 15 TABLET, FILM COATED ORAL at 20:52

## 2025-03-16 RX ADMIN — SODIUM CHLORIDE 1000 ML: 0.9 INJECTION, SOLUTION INTRAVENOUS at 18:44

## 2025-03-16 RX ADMIN — APIXABAN 10 MG: 5 TABLET, FILM COATED ORAL at 07:54

## 2025-03-16 RX ADMIN — ATORVASTATIN CALCIUM 20 MG: 20 TABLET, FILM COATED ORAL at 07:55

## 2025-03-16 RX ADMIN — ACETAMINOPHEN 650 MG: 325 TABLET, FILM COATED ORAL at 15:58

## 2025-03-16 RX ADMIN — MAGNESIUM OXIDE TAB 400 MG (241.3 MG ELEMENTAL MG) 800 MG: 400 (241.3 MG) TAB at 19:24

## 2025-03-16 RX ADMIN — FAMOTIDINE 20 MG: 20 TABLET, FILM COATED ORAL at 07:56

## 2025-03-16 RX ADMIN — PANTOPRAZOLE SODIUM 20 MG: 20 TABLET, DELAYED RELEASE ORAL at 05:26

## 2025-03-16 RX ADMIN — HYDROCORTISONE 1 APPLICATION: 1 CREAM TOPICAL at 08:04

## 2025-03-16 RX ADMIN — HYDROCORTISONE SODIUM SUCCINATE 50 MG: 100 INJECTION, POWDER, FOR SOLUTION INTRAMUSCULAR; INTRAVENOUS at 05:27

## 2025-03-16 RX ADMIN — HYDROCORTISONE SODIUM SUCCINATE 50 MG: 100 INJECTION, POWDER, FOR SOLUTION INTRAMUSCULAR; INTRAVENOUS at 20:52

## 2025-03-16 RX ADMIN — NALOXONE HYDROCHLORIDE 1 MG: 0.4 INJECTION, SOLUTION INTRAMUSCULAR; INTRAVENOUS; SUBCUTANEOUS at 18:50

## 2025-03-16 RX ADMIN — MAGNESIUM OXIDE TAB 400 MG (241.3 MG ELEMENTAL MG) 800 MG: 400 (241.3 MG) TAB at 08:00

## 2025-03-16 NOTE — ASSESSMENT & PLAN NOTE
PTA sacral ulcer  Staff at [facility] states he developed this during a recent hospitalization/stay in nursing home for rehab  Being followed by wound care  Continue local care

## 2025-03-16 NOTE — PROGRESS NOTES
Progress Note - Hospitalist   Name: Rogelio Guerra 59 y.o. male I MRN: [id number]  Unit/Bed#: [facility] 528-01 I Date of Admission: 3/13/2025   Date of Service: 3/16/2025 I Hospital Day: 3    Assessment & Plan  Acute extensive venous thrombosis  Transfer from [facility] for altered mental status.  Recently hospitalized  at [facility] and discharged on 3/6/2025, he was diagnosed with sepsis and received treatment for pneumonia and completed antibiotics during his hospital stay.  Workup here in  ED with CT chest abdomen pelvis revealed extensive thrombosis of the IVC, bilateral iliac and left femoral veins  CT PE study- no PE  lower extremity venous Dopplers- bilateral- Evidence of non-occlusive deep venous thrombosis in the common femoral, femoral, and popliteal veins.  Discussed with IR to evaluate for clot removal versus thrombolysis- per IR  there is no plan for invasive procedure/thrombectomy  Was seen by heme-onc. Because there are no invasive procedures planned, could consider switching to DOAC lifelong   A script for eliquis was sent to [facility] for a price check, called down to [facility] and script is free (not just for first month but they said its free ongoing).   Start Eliquis 3/15    Adrenal insufficiency (HCC)  History of adrenal sufficiency  Recent hospitalization notes reviewed patient on hydrocortisone 15 mg in the morning and 5 mg in the evening  Presently with hypotension we will have him on stress dose hydrocortisone 50 mg every 6 hours   Decrease hydrocortisone to 50 mg every 12 hours, continue to taper down as BP allows  Will place consult to endocrinology    Altered mental status  Presents with altered mental status, has hx Down syndrome and resides at [facility]  CT head- No acute intracranial abnormality. Chronic microangiopathic changes and moderate central greater than cortical volume loss  Infection workup including UA negative. Blood cultures negative at 24 hours  Avoid  neurotoxins  Optimize metabolic parameters  Previous provider spoke with nurse at [facility]. She states he has lived at the [facility] but more recently he was admitted to a nursing home for rehab after a hospitalization and it appears he developed an open sacral wound and had some decline during that time period  He is bedridden at baseline  He does eat a soft diet and takes his pills well at home  Updated contact info in EPIC- primary  over the weekend will be nurse Walker  Sacral ulcer (HCC)  PTA sacral ulcer  Staff at [facility] states he developed this during a recent hospitalization/stay in nursing home for rehab  Being followed by wound care  Continue local care  Down syndrome  Patient with history of Down syndrome and diagnosis of early onset dementia  Supportive care  Hypothyroidism  TSH 5.657 free T4 1.33  [facility] records document he was on levothyroxine 88 mcg daily but hospital records from recent admissions to [facility] in Jan and March both say he should be on 100 mcg so dose adjusted  Monitor TSH in 4 to 6 weeks and outpatient follow-up  Anxiety  Continue mirtazapine  Trazodone  Hypokalemia  Replace potassium  Follow-up a.m. labs    VTE Pharmacologic Prophylaxis: VTE Score: 8 High Risk (Score >/= 5) - Pharmacological DVT Prophylaxis Ordered: apixaban (Eliquis). Sequential Compression Devices Ordered.    Mobility:   Basic Mobility Inpatient Raw Score: 7  JH-HLM Goal: 2: Bed activities/Dependent transfer  JH-HLM Achieved: 2: Bed activities/Dependent transfer  JH-HLM Goal achieved. Continue to encourage appropriate mobility.    Patient Centered Rounds: I performed bedside rounds with nursing staff today.   Discussions with Specialists or Other Care Team Provider: Endocrinology     Education and Discussions with Family / Patient: Updated  (Nurse Walker ) via phone.    Current Length of Stay: 3 day(s)  Current Patient Status: Inpatient   Certification Statement: The patient will  continue to require additional inpatient hospital stay due to safe discharge planning  Discharge Plan: Anticipate discharge in 24-48 hrs to prior assisted or independent living facility.    Code Status: Level 1 - Full Code    Subjective   Patient seen and evaluated.  Patient not in any acute distress.    Objective :  Temp:  [97.8 °F (36.6 °C)-98.6 °F (37 °C)] 97.9 °F (36.6 °C)  HR:  [73-86] 75  BP: (106-114)/(65-76) 114/76  Resp:  [16-18] 18  SpO2:  [95 %-98 %] 97 %    Body mass index is 28.88 kg/m².     Input and Output Summary (last 24 hours):     Intake/Output Summary (Last 24 hours) at 3/16/2025 1347  Last data filed at 3/16/2025 1300  Gross per 24 hour   Intake 700 ml   Output 1500 ml   Net -800 ml       Physical Exam  Vitals reviewed.   HENT:      Head: Normocephalic.      Nose: No congestion.      Mouth/Throat:      Pharynx: No oropharyngeal exudate or posterior oropharyngeal erythema.   Eyes:      Conjunctiva/sclera: Conjunctivae normal.   Cardiovascular:      Rate and Rhythm: Normal rate and regular rhythm.   Pulmonary:      Effort: Pulmonary effort is normal.   Abdominal:      General: Abdomen is flat.      Palpations: Abdomen is soft.   Skin:     General: Skin is warm and dry.   Neurological:      General: No focal deficit present.      Mental Status: He is alert. Mental status is at baseline.           Lines/Drains:  Lines/Drains/Airways       Active Status       Name Placement date Placement time Site Days    External Urinary Catheter 03/13/25  2100  -- 2                            Lab Results: I have reviewed the following results:   Results from last 7 days   Lab Units 03/15/25  0316 03/13/25  0721   WBC Thousand/uL 6.65 5.01   HEMOGLOBIN g/dL 10.6* 11.7*   HEMATOCRIT % 33.9* 37.4   PLATELETS Thousands/uL 326 330   SEGS PCT %  --  53   LYMPHO PCT %  --  31   MONO PCT %  --  13*   EOS PCT %  --  1     Results from last 7 days   Lab Units 03/16/25  0523 03/15/25  0316 03/14/25  0557   SODIUM mmol/L 140    < > 140   POTASSIUM mmol/L 3.3*   < > 3.0*   CHLORIDE mmol/L 106   < > 107   CO2 mmol/L 28   < > 28   BUN mg/dL 8   < > 8   CREATININE mg/dL 0.58*   < > 0.52*   ANION GAP mmol/L 6   < > 5   CALCIUM mg/dL 7.7*   < > 7.4*   ALBUMIN g/dL  --   --  2.2*   TOTAL BILIRUBIN mg/dL  --   --  0.28   ALK PHOS U/L  --   --  60   ALT U/L  --   --  29   AST U/L  --   --  21   GLUCOSE RANDOM mg/dL 144*   < > 126    < > = values in this interval not displayed.     Results from last 7 days   Lab Units 03/13/25  0723   INR  1.09             Results from last 7 days   Lab Units 03/13/25  1019 03/13/25  0723   LACTIC ACID mmol/L 0.9 2.3*   PROCALCITONIN ng/ml  --  0.16       Recent Cultures (last 7 days):   Results from last 7 days   Lab Units 03/13/25  0736 03/13/25  0723   BLOOD CULTURE  No Growth at 48 hrs. No Growth at 48 hrs.       Imaging Results Review: I reviewed radiology reports from this admission including: procedure reports.  Other Study Results Review: No additional pertinent studies reviewed.    Last 24 Hours Medication List:     Current Facility-Administered Medications:     acetaminophen (TYLENOL) tablet 650 mg, Q6H PRN    aluminum-magnesium hydroxide-simethicone (MAALOX) oral suspension 30 mL, Q6H PRN    apixaban (ELIQUIS) tablet 10 mg, BID **FOLLOWED BY** [START ON 3/18/2025] apixaban (ELIQUIS) tablet 5 mg, BID    atorvastatin (LIPITOR) tablet 20 mg, Daily    famotidine (PEPCID) tablet 20 mg, BID    hydrocortisone (Solu-CORTEF) injection 50 mg, Q12H CLAY    hydrocortisone 1 % cream, BID    levothyroxine tablet 100 mcg, Early Morning    magnesium Oxide (MAG-OX) tablet 800 mg, BID    mirtazapine (REMERON) tablet 15 mg, HS    ondansetron (ZOFRAN) injection 4 mg, Q6H PRN    pantoprazole (PROTONIX) EC tablet 20 mg, Early Morning    polyethylene glycol (MIRALAX) packet 17 g, Daily    white petrolatum-mineral oil (EUCERIN,HYDROCERIN) cream, TID PRN    Administrative Statements   Today, Patient Was Seen By: Pepe Conway,  MD FLORES have spent a total time of 45 minutes in caring for this patient on the day of the visit/encounter including Diagnostic results, Risks and benefits of tx options, Importance of tx compliance, Documenting in the medical record, Reviewing/placing orders in the medical record (including tests, medications, and/or procedures), and Communicating with other healthcare professionals .    **Please Note: This note may have been constructed using a voice recognition system.**

## 2025-03-16 NOTE — ASSESSMENT & PLAN NOTE
TSH 5.657 free T4 1.33  [facility] records document he was on levothyroxine 88 mcg daily but hospital records from recent admissions to [facility] in Jan and March both say he should be on 100 mcg so dose adjusted  Monitor TSH in 4 to 6 weeks and outpatient follow-up

## 2025-03-16 NOTE — ASSESSMENT & PLAN NOTE
History of adrenal sufficiency  Recent hospitalization notes reviewed patient on hydrocortisone 15 mg in the morning and 5 mg in the evening  Presently with hypotension we will have him on stress dose hydrocortisone 50 mg every 6 hours   Decrease hydrocortisone to 50 mg every 12 hours, continue to taper down as BP allows  Will place consult to endocrinology

## 2025-03-16 NOTE — CASE MANAGEMENT
Case Management Progress Note    Patient name Rogelio Guerra  Location [facility] 528/[facility] 528-01 MRN [id number]  : 1965 Date 3/16/2025       LOS (days): 3  Geometric Mean LOS (GMLOS) (days): 3.4  Days to GMLOS:0.6        OBJECTIVE:        Current admission status: Inpatient  Preferred Pharmacy:   [facility] [place], [place] - [address]  [address]  [facility] [address]  Phone: [phone] Fax: [phone]    [place], [place] - [address]  [address] [facility] [address]  Phone: [phone] Fax: [phone]    [facility] [place] [place] - [address]  [address]  [place] [address]  Phone: [phone] Fax: [phone]    Primary Care Provider: Jared Tee MD    Primary Insurance: MEDICARE  Secondary Insurance: [place] MED[employer]    PROGRESS NOTE:  Received message from nurseTatum at [facility]. [phone]. She is requesting pt d/c with palliative care. TC to Tatum today to clarify if she wants palliative c/s in house or home hospice? Will follow

## 2025-03-16 NOTE — ASSESSMENT & PLAN NOTE
Transfer from half-way/[facility] for altered mental status.  Recently hospitalized  at [facility] and discharged on 3/6/2025, he was diagnosed with sepsis and received treatment for pneumonia and completed antibiotics during his hospital stay.  Workup here in  ED with CT chest abdomen pelvis revealed extensive thrombosis of the IVC, bilateral iliac and left femoral veins  CT PE study- no PE  lower extremity venous Dopplers- bilateral- Evidence of non-occlusive deep venous thrombosis in the common femoral, femoral, and popliteal veins.  Discussed with IR to evaluate for clot removal versus thrombolysis- per IR  there is no plan for invasive procedure/thrombectomy  Was seen by heme-onc. Because there are no invasive procedures planned, could consider switching to DOAC lifelong   A script for eliquis was sent to [facility] for a price check, called down to [facility] and script is free (not just for first month but they said its free ongoing).   Start Eliquis 3/15

## 2025-03-16 NOTE — RAPID RESPONSE
Rapid Response Note  Rogelio Guerra 59 y.o. male MRN: [id number]  Unit/Bed#: [facility] 528-01 Encounter: [id number]    Rapid Response Notification(s):   Response called date/time:  3/16/2025 6:41 PM  Response team arrival date/time:  3/16/2025 6:41 PM  Response end date/time:  3/16/2025 6:52 PM  Level of care:  Medr  Rapid response location:  [facility] unit  Primary reason for rapid response call:  Acute change in neuro status    Rapid Response Intervention(s):   Fluids administered:  Normal saline       Assessment/plan:   Encephalopathy--BS WNL, recent oxy first done given narcan with appropriate response and return to baseline mental status, d/c oxycodone, PRN narcan, if restart consider low dose 2.5mg  Hypotension--I stat hgb 10, labs pending, responsive to IVF, consider increase of hydrocortisone if persistent     Rapid Response Outcome:   Transfer:  Remain on floor  Primary service notified of transfer: Yes (AT BEDSIDE)    Code Status: Level 1 (Full Code)      Family notified: Primary team to notify Family Member       Background/Situation:   Rogelio Guerra is a 59 y.o. male who found to have extensive IVC thrombus placed on eliquis.     RRT today for change in mental status given first dose of oxycodone 2 hrs prior. BS WNL. Opens eyes to painful stimuli no verbal response. Narcan 1mg IVP  given with immediate improvement states name 1-2 word sentences which is baseline.    Review of Systems    Objective:   Vitals:    03/16/25 1513 03/16/25 1841 03/16/25 1842 03/16/25 1851   BP: 115/75 (!) 86/55 (!) 84/60 95/58   BP Location: Right arm      Pulse: 91 83     Resp: 16      Temp: 98.3 °F (36.8 °C)      TempSrc: Oral      SpO2: 97% 96%     Weight:       Height:         Physical Exam

## 2025-03-16 NOTE — ASSESSMENT & PLAN NOTE
Presents with altered mental status, has hx Down syndrome and resides at skilled nursing  CT head- No acute intracranial abnormality. Chronic microangiopathic changes and moderate central greater than cortical volume loss  Infection workup including UA negative. Blood cultures negative at 24 hours  Avoid neurotoxins  Optimize metabolic parameters  Previous provider spoke with nurse at skilled nursing. She states he has lived at the skilled nursing but more recently he was admitted to a nursing home for rehab after a hospitalization and it appears he developed an open sacral wound and had some decline during that time period  He is bedridden at baseline  He does eat a soft diet and takes his pills well at home  Updated contact info in EPIC- primary  over the weekend will be nurse Walker

## 2025-03-17 LAB
ALBUMIN SERPL BCG-MCNC: 2.3 G/DL (ref 3.5–5)
ALP SERPL-CCNC: 62 U/L (ref 34–104)
ALT SERPL W P-5'-P-CCNC: 27 U/L (ref 7–52)
ANION GAP SERPL CALCULATED.3IONS-SCNC: 4 MMOL/L (ref 4–13)
AST SERPL W P-5'-P-CCNC: 12 U/L (ref 13–39)
BASOPHILS # BLD AUTO: 0.02 THOUSANDS/ÂΜL (ref 0–0.1)
BASOPHILS NFR BLD AUTO: 0 % (ref 0–1)
BILIRUB SERPL-MCNC: 0.3 MG/DL (ref 0.2–1)
BUN SERPL-MCNC: 8 MG/DL (ref 5–25)
CALCIUM ALBUM COR SERPL-MCNC: 9 MG/DL (ref 8.3–10.1)
CALCIUM SERPL-MCNC: 7.6 MG/DL (ref 8.4–10.2)
CHLORIDE SERPL-SCNC: 107 MMOL/L (ref 96–108)
CO2 SERPL-SCNC: 30 MMOL/L (ref 21–32)
CREAT SERPL-MCNC: 0.69 MG/DL (ref 0.6–1.3)
EOSINOPHIL # BLD AUTO: 0.01 THOUSAND/ÂΜL (ref 0–0.61)
EOSINOPHIL NFR BLD AUTO: 0 % (ref 0–6)
ERYTHROCYTE [DISTWIDTH] IN BLOOD BY AUTOMATED COUNT: 19.3 % (ref 11.6–15.1)
GFR SERPL CREATININE-BSD FRML MDRD: 102 ML/MIN/1.73SQ M
GLUCOSE SERPL-MCNC: 117 MG/DL (ref 65–140)
HCT VFR BLD AUTO: 34.4 % (ref 36.5–49.3)
HGB BLD-MCNC: 10.6 G/DL (ref 12–17)
IMM GRANULOCYTES # BLD AUTO: 0.04 THOUSAND/UL (ref 0–0.2)
IMM GRANULOCYTES NFR BLD AUTO: 1 % (ref 0–2)
LYMPHOCYTES # BLD AUTO: 1.31 THOUSANDS/ÂΜL (ref 0.6–4.47)
LYMPHOCYTES NFR BLD AUTO: 17 % (ref 14–44)
MAGNESIUM SERPL-MCNC: 1.8 MG/DL (ref 1.9–2.7)
MCH RBC QN AUTO: 30.9 PG (ref 26.8–34.3)
MCHC RBC AUTO-ENTMCNC: 30.8 G/DL (ref 31.4–37.4)
MCV RBC AUTO: 100 FL (ref 82–98)
MONOCYTES # BLD AUTO: 0.44 THOUSAND/ÂΜL (ref 0.17–1.22)
MONOCYTES NFR BLD AUTO: 6 % (ref 4–12)
NEUTROPHILS # BLD AUTO: 5.73 THOUSANDS/ÂΜL (ref 1.85–7.62)
NEUTS SEG NFR BLD AUTO: 76 % (ref 43–75)
NRBC BLD AUTO-RTO: 0 /100 WBCS
PHOSPHATE SERPL-MCNC: 2.6 MG/DL (ref 2.3–4.1)
PLATELET # BLD AUTO: 302 THOUSANDS/UL (ref 149–390)
PMV BLD AUTO: 9.8 FL (ref 8.9–12.7)
POTASSIUM SERPL-SCNC: 3.6 MMOL/L (ref 3.5–5.3)
PROT SERPL-MCNC: 5.8 G/DL (ref 6.4–8.4)
RBC # BLD AUTO: 3.43 MILLION/UL (ref 3.88–5.62)
SODIUM SERPL-SCNC: 141 MMOL/L (ref 135–147)
WBC # BLD AUTO: 7.55 THOUSAND/UL (ref 4.31–10.16)

## 2025-03-17 PROCEDURE — 83735 ASSAY OF MAGNESIUM: CPT | Performed by: FAMILY MEDICINE

## 2025-03-17 PROCEDURE — 99222 1ST HOSP IP/OBS MODERATE 55: CPT | Performed by: INTERNAL MEDICINE

## 2025-03-17 PROCEDURE — 80053 COMPREHEN METABOLIC PANEL: CPT | Performed by: FAMILY MEDICINE

## 2025-03-17 PROCEDURE — 85025 COMPLETE CBC W/AUTO DIFF WBC: CPT | Performed by: FAMILY MEDICINE

## 2025-03-17 PROCEDURE — 99232 SBSQ HOSP IP/OBS MODERATE 35: CPT | Performed by: FAMILY MEDICINE

## 2025-03-17 PROCEDURE — 92526 ORAL FUNCTION THERAPY: CPT

## 2025-03-17 PROCEDURE — 84100 ASSAY OF PHOSPHORUS: CPT | Performed by: FAMILY MEDICINE

## 2025-03-17 RX ORDER — HYDROCORTISONE 10 MG/1
10 TABLET ORAL EVERY EVENING
Status: DISCONTINUED | OUTPATIENT
Start: 2025-03-21 | End: 2025-03-19 | Stop reason: HOSPADM

## 2025-03-17 RX ORDER — HYDROCORTISONE 5 MG/1
5 TABLET ORAL EVERY EVENING
Status: DISCONTINUED | OUTPATIENT
Start: 2025-03-23 | End: 2025-03-19 | Stop reason: HOSPADM

## 2025-03-17 RX ADMIN — ATORVASTATIN CALCIUM 20 MG: 20 TABLET, FILM COATED ORAL at 08:25

## 2025-03-17 RX ADMIN — HYDROCORTISONE SODIUM SUCCINATE 50 MG: 100 INJECTION, POWDER, FOR SOLUTION INTRAMUSCULAR; INTRAVENOUS at 08:25

## 2025-03-17 RX ADMIN — APIXABAN 10 MG: 5 TABLET, FILM COATED ORAL at 17:58

## 2025-03-17 RX ADMIN — MIRTAZAPINE 15 MG: 15 TABLET, FILM COATED ORAL at 21:19

## 2025-03-17 RX ADMIN — POLYETHYLENE GLYCOL 3350 17 G: 17 POWDER, FOR SOLUTION ORAL at 08:24

## 2025-03-17 RX ADMIN — PANTOPRAZOLE SODIUM 20 MG: 20 TABLET, DELAYED RELEASE ORAL at 05:03

## 2025-03-17 RX ADMIN — FAMOTIDINE 20 MG: 20 TABLET, FILM COATED ORAL at 08:24

## 2025-03-17 RX ADMIN — HYDROCORTISONE 15 MG: 10 TABLET ORAL at 17:58

## 2025-03-17 RX ADMIN — LEVOTHYROXINE SODIUM 100 MCG: 100 TABLET ORAL at 05:03

## 2025-03-17 RX ADMIN — FAMOTIDINE 20 MG: 20 TABLET, FILM COATED ORAL at 17:58

## 2025-03-17 RX ADMIN — HYDROCORTISONE 1 APPLICATION: 1 CREAM TOPICAL at 17:59

## 2025-03-17 RX ADMIN — HYDROCORTISONE 1 APPLICATION: 1 CREAM TOPICAL at 08:28

## 2025-03-17 RX ADMIN — APIXABAN 10 MG: 5 TABLET, FILM COATED ORAL at 08:24

## 2025-03-17 NOTE — PROGRESS NOTES
Progress Note - Hospitalist   Name: Rogelio Guerra 59 y.o. male I MRN: [id number]  Unit/Bed#: [facility] 528-01 I Date of Admission: 3/13/2025   Date of Service: 3/17/2025 I Hospital Day: 4    Assessment & Plan  Acute extensive venous thrombosis  Transfer from [facility] for altered mental status.  Recently hospitalized  at [facility] and discharged on 3/6/2025, he was diagnosed with sepsis and received treatment for pneumonia and completed antibiotics during his hospital stay.  Workup here in  ED with CT chest abdomen pelvis revealed extensive thrombosis of the IVC, bilateral iliac and left femoral veins  CT PE study- no PE  Lower extremity venous Dopplers- bilateral- Evidence of non-occlusive deep venous thrombosis in the common femoral, femoral, and popliteal veins.  Discussed with IR to evaluate for clot removal versus thrombolysis- per IR  there is no plan for invasive procedure/thrombectomy  Was seen by heme-onc. Because there are no invasive procedures planned, could consider switching to DOAC lifelong   A script for eliquis was sent to [employer] for a price check, [facility] and script is free (not just for first month but they said its free ongoing).   Start Eliquis 3/15    Adrenal insufficiency (HCC)  History of adrenal insufficiency  Recent hospitalization notes reviewed patient on hydrocortisone 15 mg in the morning and 5 mg in the evening  Decrease hydrocortisone to 50 mg every 12 hours, continue to taper down as BP allows  Will place consult to endocrinology     Altered mental status  Presents with altered mental status, has hx Down syndrome and resides at [facility]  CT head- No acute intracranial abnormality. Chronic microangiopathic changes and moderate central greater than cortical volume loss  Infection workup including UA negative. Blood cultures negative at 24 hours  Avoid neurotoxins  Optimize metabolic parameters  Previous provider spoke with nurse at [facility]. She states he  has lived at the group home but more recently he was admitted to a nursing home for rehab after a hospitalization and it appears he developed an open sacral wound and had some decline during that time period  He is bedridden at baseline  He does eat a soft diet and takes his pills well at home  Updated contact info in EPIC- primary  over the weekend will be nurse Walker  Back to baseline  Patient had rapid response yesterday due to sedative effects of oxycodone.  Avoid narcotics.  Sacral ulcer (HCC)  PTA sacral ulcer  Staff at [facility] states he developed this during a recent hospitalization/stay in nursing home for rehab  Being followed by wound care  Continue local care  Down syndrome  Patient with history of Down syndrome and diagnosis of early onset dementia  Supportive care  Hypothyroidism  TSH 5.657 free T4 1.33  [facility] records document he was on levothyroxine 88 mcg daily but hospital records from recent admissions to [facility] in Jan and March both say he should be on 100 mcg so dose adjusted  Monitor TSH in 4 to 6 weeks and outpatient follow-up  Anxiety  Continue mirtazapine  Trazodone  Hypokalemia  Replace potassium  Follow-up a.m. labs    VTE Pharmacologic Prophylaxis: VTE Score: 8 Eliquis    Mobility:   Basic Mobility Inpatient Raw Score: 7  JH-HLM Goal: 2: Bed activities/Dependent transfer  JH-HLM Achieved: 2: Bed activities/Dependent transfer  JH-HLM Goal achieved. Continue to encourage appropriate mobility.    Patient Centered Rounds: I performed bedside rounds with nursing staff today.   Discussions with Specialists or Other Care Team Provider: Endocrinology    Education and Discussions with Family / Patient: Updated  (nurse Mccabe) via phone.    Current Length of Stay: 4 day(s)  Current Patient Status: Inpatient   Certification Statement: The patient will continue to require additional inpatient hospital stay due to adrenal insufficiency with stress dosing  Discharge Plan:  Anticipate discharge tomorrow to prior assisted or independent living facility.    Code Status: Level 1 - Full Code    Subjective   Patient seen evaluated today at bedside.  Patient not in any acute distress.    Objective :  Temp:  [97.5 °F (36.4 °C)-98.5 °F (36.9 °C)] 98.4 °F (36.9 °C)  HR:  [75-91] 76  BP: ()/(55-75) 120/71  Resp:  [16-18] 18  SpO2:  [95 %-100 %] 95 %  O2 Device: None (Room air)    Body mass index is 28.88 kg/m².     Input and Output Summary (last 24 hours):     Intake/Output Summary (Last 24 hours) at 3/17/2025 1146  Last data filed at 3/17/2025 0758  Gross per 24 hour   Intake 700 ml   Output 1700 ml   Net -1000 ml       Physical Exam  Vitals reviewed.   HENT:      Head: Normocephalic.      Nose: No congestion.      Mouth/Throat:      Pharynx: No oropharyngeal exudate or posterior oropharyngeal erythema.   Eyes:      Conjunctiva/sclera: Conjunctivae normal.   Cardiovascular:      Rate and Rhythm: Normal rate and regular rhythm.   Pulmonary:      Effort: Pulmonary effort is normal.   Abdominal:      General: Abdomen is flat.      Palpations: Abdomen is soft.   Skin:     General: Skin is warm and dry.   Neurological:      General: No focal deficit present.      Mental Status: He is alert. Mental status is at baseline.           Lines/Drains:  Lines/Drains/Airways       Active Status       Name Placement date Placement time Site Days    External Urinary Catheter 03/13/25  2100  -- 3                            Lab Results: I have reviewed the following results:   Results from last 7 days   Lab Units 03/17/25  0716   WBC Thousand/uL 7.55   HEMOGLOBIN g/dL 10.6*   HEMATOCRIT % 34.4*   PLATELETS Thousands/uL 302   SEGS PCT % 76*   LYMPHO PCT % 17   MONO PCT % 6   EOS PCT % 0     Results from last 7 days   Lab Units 03/17/25  0716   SODIUM mmol/L 141   POTASSIUM mmol/L 3.6   CHLORIDE mmol/L 107   CO2 mmol/L 30   BUN mg/dL 8   CREATININE mg/dL 0.69   ANION GAP mmol/L 4   CALCIUM mg/dL 7.6*    ALBUMIN g/dL 2.3*   TOTAL BILIRUBIN mg/dL 0.30   ALK PHOS U/L 62   ALT U/L 27   AST U/L 12*   GLUCOSE RANDOM mg/dL 117     Results from last 7 days   Lab Units 03/13/25  0723   INR  1.09     Results from last 7 days   Lab Units 03/16/25  1840   POC GLUCOSE mg/dl 124         Results from last 7 days   Lab Units 03/16/25  1847 03/13/25  1019 03/13/25  0723   LACTIC ACID mmol/L 1.3 0.9 2.3*   PROCALCITONIN ng/ml  --   --  0.16       Recent Cultures (last 7 days):   Results from last 7 days   Lab Units 03/13/25  0736 03/13/25  0723   BLOOD CULTURE  No Growth at 72 hrs. No Growth at 72 hrs.       Imaging Results Review: No pertinent imaging studies reviewed.  Other Study Results Review: No additional pertinent studies reviewed.    Last 24 Hours Medication List:     Current Facility-Administered Medications:     acetaminophen (TYLENOL) tablet 650 mg, Q6H PRN    aluminum-magnesium hydroxide-simethicone (MAALOX) oral suspension 30 mL, Q6H PRN    apixaban (ELIQUIS) tablet 10 mg, BID **FOLLOWED BY** [START ON 3/18/2025] apixaban (ELIQUIS) tablet 5 mg, BID    atorvastatin (LIPITOR) tablet 20 mg, Daily    famotidine (PEPCID) tablet 20 mg, BID    hydrocortisone (Solu-CORTEF) injection 50 mg, Q12H CLAY    hydrocortisone 1 % cream, BID    levothyroxine tablet 100 mcg, Early Morning    mirtazapine (REMERON) tablet 15 mg, HS    naloxone (NARCAN) 0.04 mg/mL syringe 0.04 mg, Q1MIN PRN    ondansetron (ZOFRAN) injection 4 mg, Q6H PRN    pantoprazole (PROTONIX) EC tablet 20 mg, Early Morning    polyethylene glycol (MIRALAX) packet 17 g, Daily    white petrolatum-mineral oil (EUCERIN,HYDROCERIN) cream, TID PRN    Administrative Statements   Today, Patient Was Seen By: Pepe Conway MD  I have spent a total time of 45 minutes in caring for this patient on the day of the visit/encounter including Diagnostic results, Prognosis, Patient and family education, Importance of tx compliance, Counseling / Coordination of care,  Reviewing/placing orders in the medical record (including tests, medications, and/or procedures), and Obtaining or reviewing history  .    **Please Note: This note may have been constructed using a voice recognition system.**

## 2025-03-17 NOTE — ASSESSMENT & PLAN NOTE
Presents with altered mental status, has hx Down syndrome and resides at [facility]  CT head- No acute intracranial abnormality. Chronic microangiopathic changes and moderate central greater than cortical volume loss  Infection workup including UA negative. Blood cultures negative at 24 hours  Avoid neurotoxins  Optimize metabolic parameters  Previous provider spoke with nurse at [facility]. She states he has lived at the [facility] but more recently he was admitted to a nursing home for rehab after a hospitalization and it appears he developed an open sacral wound and had some decline during that time period  He is bedridden at baseline  He does eat a soft diet and takes his pills well at home  Updated contact info in EPIC- primary  over the weekend will be nurse Denise  Back to baseline  Patient had rapid response yesterday due to sedative effects of oxycodone.  Avoid narcotics.

## 2025-03-17 NOTE — SPEECH THERAPY NOTE
Speech Language/Pathology  Speech-Language Pathology Progress Note      Patient Name: Rogelio Guerra    Today's Date: 3/17/2025      Subjective:  Pt was awake and alert. He was sitting upright in bed. .    Objective:  Pt was seen today for dysphagia therapy. Current diet is dysphagia 3 dental soft with thin liquids. This diet is closest to his baseline quarter sized diet.  Pt was on room air. Oral care had already been completed. Focus of today's session was  assess with baseline diet and for any changes needed . Pt seen with the lunch tray-chicken, chopped beans, peaches, chicken noodle soup, juice by straw..  Swallow function:   Pt needs to be fed but has been tolerating all of his meals well with staff.  Bolus retrieval and mastication were timely. AP transfer was complete. Pharyngeal swallow initiation was present. Hyolaryngeal excursion was adequate. No s/s aspiration occurred during session today.  He periodically does use multiple swallows.      Assessment:  Swallow function is stable with current diet. Diet texture and liquid consistency remains appropriate at this time.      Plan:  Continue dysphagia 3 dental soft and thin liquids. No additional ST f/u needed at this time. Please re consult with any new changes or concerns.   Resume the diet of quarter sized regular at his home upon d/c.

## 2025-03-17 NOTE — ASSESSMENT & PLAN NOTE
History of adrenal insufficiency  Recent hospitalization notes reviewed patient on hydrocortisone 15 mg in the morning and 5 mg in the evening  Decrease hydrocortisone to 50 mg every 12 hours, continue to taper down as BP allows  Will place consult to endocrinology

## 2025-03-17 NOTE — ASSESSMENT & PLAN NOTE
History of presumed adrenal sufficiency in the setting of hypotension during a recent admission --discharged on hydrocortisone 15 g in the morning and 5 mg in the afternoon  Patient presented hypotensive in the setting of extensive DVT --was stress dosed on presentation  Per primary, has been tapered to IV hydrocortisone 50 mg every 12 hours  Hemodynamically patient remained stable    Continue taper with oral hydrocortisone BID (morning and afternoon) as 45 mg and 15 mg x 2 days, then 35 mg and 15 mg x 2 days, then 30 mg and 10 mg x 2 days, and then return to home regimen of hydrocortisone 15 mg in the morning and 5 mg in the afternoon  In the outpatient setting, evaluate for true adrenal insufficiency with ACTH stimulation test  Outpatient follow-up with endocrinology

## 2025-03-17 NOTE — CASE MANAGEMENT
Case Management Discharge Planning Note    Patient name Rogelio Guerra  Location [facility] 528/[facility] 528-01 MRN [id number]  : 1965 Date 3/17/2025       Current Admission Date: 3/13/2025  Current Admission Diagnosis:Acute extensive venous thrombosis   Patient Active Problem List    Diagnosis Date Noted Date Diagnosed    Hypokalemia 2025     Sacral ulcer (HCC) 2025     Altered mental status 2025     Down syndrome 2025     Hypothyroidism 2025     Adrenal insufficiency (HCC) 2025     Acute extensive venous thrombosis 2025     Anxiety 2025       LOS (days): 4  Geometric Mean LOS (GMLOS) (days): 3.4  Days to GMLOS:-0.4     OBJECTIVE:  Risk of Unplanned Readmission Score: 10.84         Current admission status: Inpatient   Preferred Pharmacy:   [facility] [place] [place] [facility] [address]  [address]  [facility] [address]  Phone: [phone] Fax: [phone]    [place], [place] - [address] [facility]  [address] [facility] [address]  Phone: [phone] Fax: [phone]    [facility] [place] [facility] [place] - [address]  [address]  [place] [address]  Phone: [phone] Fax: [phone]    Primary Care Provider: Jared Tee MD    Primary Insurance: MEDICARE  Secondary Insurance[place]    DISCHARGE DETAILS:    Additional Comments: CM called Tatum from [employer] ([phone]) and left a voicemail requesting a return call. CM department to follow.

## 2025-03-17 NOTE — ASSESSMENT & PLAN NOTE
Transfer from [facility] living[facility] for altered mental status.  Recently hospitalized  at [facility] and discharged on 3/6/2025, he was diagnosed with sepsis and received treatment for pneumonia and completed antibiotics during his hospital stay.  Workup here in  ED with CT chest abdomen pelvis revealed extensive thrombosis of the IVC, bilateral iliac and left femoral veins  CT PE study- no PE  Lower extremity venous Dopplers- bilateral- Evidence of non-occlusive deep venous thrombosis in the common femoral, femoral, and popliteal veins.  Discussed with IR to evaluate for clot removal versus thrombolysis- per IR  there is no plan for invasive procedure/thrombectomy  Was seen by heme-onc. Because there are no invasive procedures planned, could consider switching to DOAC lifelong   A script for eliquis was sent to [employer] for a price check, called down to [employer] and script is free (not just for first month but they said its free ongoing).   Start Eliquis 3/15

## 2025-03-17 NOTE — CASE MANAGEMENT
Case Management Discharge Planning Note    Patient name Rogelio Guerra  Location [facility] 528/[facility] 528-01 MRN [id number]  : 1965 Date 3/17/2025       Current Admission Date: 3/13/2025  Current Admission Diagnosis:Acute extensive venous thrombosis   Patient Active Problem List    Diagnosis Date Noted Date Diagnosed    Hypokalemia 2025     Sacral ulcer (HCC) 2025     Altered mental status 2025     Down syndrome 2025     Hypothyroidism 2025     Adrenal insufficiency (HCC) 2025     Acute extensive venous thrombosis 2025     Anxiety 2025       LOS (days): 4  Geometric Mean LOS (GMLOS) (days): 3.4  Days to GMLOS:-0.5     OBJECTIVE:  Risk of Unplanned Readmission Score: 11.96         Current admission status: Inpatient   Preferred Pharmacy:   [facility] [place] - [address]  [address] [facility] [address]  Phone: [phone] Fax: [phone]    [place], [place] - [address] [facility]  [address] [facility] [address]  Phone: [phone] Fax: [phone]    [facility] [place] [facility] [place] - [address]  [address]  [place] [address]  Phone: [phone] Fax: [phone]    Primary Care Provider: Jared Tee MD    Primary Insurance: MEDICARE  [place]    DISCHARGE DETAILS:    Additional Comments: PORTIA called Denise from [employer] ([phone]) to inform that providers are anticipating pt may be medically stable for return to them tomorrow. She confirmed that they are interested in an outpatient Palliative consult and outpatient wound care consult. She informed that discharge paperwork will need to be sent to her prior to noon on day of discharge (F: [phone]) and that any new or changed meds will need to be sent to [facility]. CM informed Dr. Conway of [facility]. CM department to follow.

## 2025-03-17 NOTE — ASSESSMENT & PLAN NOTE
May 10, 2018      Kev Gilbert  [address] [place] [id number]        Dear ,    We are writing to inform you of your test results.    You maybe able to check the lab results via [facility], it showed your lab results including complete blood cell counts/electrolyte balance and glucose/liver and kidney function/screening test for acute colitis(CK and LDH)/pancreatic function were all normal.  Your thyroid function is stable without clinical deterioration.   I will update the stool sample test and ultrasound sonogram results when available.    Resulted Orders   CBC with platelets   Result Value Ref Range    WBC 12.4 (H) 4.0 - 11.0 10e9/L    RBC Count 5.53 4.4 - 5.9 10e12/L    Hemoglobin 16.7 13.3 - 17.7 g/dL    Hematocrit 50.6 40.0 - 53.0 %    MCV 92 78 - 100 fl    MCH 30.2 26.5 - 33.0 pg    MCHC 33.0 31.5 - 36.5 g/dL    RDW 13.1 10.0 - 15.0 %    Platelet Count 221 150 - 450 10e9/L   Comprehensive metabolic panel   Result Value Ref Range    Sodium 140 133 - 144 mmol/L    Potassium 4.1 3.4 - 5.3 mmol/L    Chloride 107 94 - 109 mmol/L    Carbon Dioxide 26 20 - 32 mmol/L    Anion Gap 7 3 - 14 mmol/L    Glucose 96 70 - 99 mg/dL    Urea Nitrogen 11 7 - 30 mg/dL    Creatinine 0.93 0.66 - 1.25 mg/dL    GFR Estimate 88 >60 mL/min/1.7m2      Comment:      Non  GFR Calc    GFR Estimate If Black >90 >60 mL/min/1.7m2      Comment:       GFR Calc    Calcium 9.3 8.5 - 10.1 mg/dL    Bilirubin Total 0.4 0.2 - 1.3 mg/dL    Albumin 4.2 3.4 - 5.0 g/dL    Protein Total 7.7 6.8 - 8.8 g/dL    Alkaline Phosphatase 110 40 - 150 U/L    ALT 49 0 - 70 U/L    AST 20 0 - 45 U/L   TSH with free T4 reflex   Result Value Ref Range    TSH 4.15 (H) 0.40 - 4.00 mU/L   Lipase   Result Value Ref Range    Lipase 51 (L) 73 - 393 U/L   Lactate Dehydrogenase   Result Value Ref Range    Lactate Dehydrogenase 169 85 - 227 U/L   CK total   Result Value Ref Range    CK Total 71 30 - 300 U/L   T4 free   Result Value  PTA sacral ulcer  Staff at [facility] states he developed this during a recent hospitalization/stay in nursing home for rehab  Being followed by wound care  Continue local care   Ref Range    T4 Free 0.99 0.76 - 1.46 ng/dL       If you have any questions or concerns, please call the clinic at the number listed above.       Sincerely,        Attila Rodriguez MD

## 2025-03-17 NOTE — ASSESSMENT & PLAN NOTE
TSH 5.657 free T4 1.33  [facility] records document he was on levothyroxine 88 mcg daily but hospital records from recent admissions to [facility] in Jan and March both say he should be on 100 mcg so dose adjusted  Monitor TSH in 4 to 6 weeks and outpatient follow-up

## 2025-03-17 NOTE — ASSESSMENT & PLAN NOTE
Down syndrome associated with increased risk of development of endocrinopathy such as hypothyroidism and adrenal insufficiency  Management as primary

## 2025-03-17 NOTE — CONSULTS
Consultation - Endocrinology   Name: Rogelio Guerra 59 y.o. male I MRN: [id number]  Unit/Bed#: [facility] 528-01 I Date of Admission: 3/13/2025   Date of Service: 3/17/2025 I Hospital Day: 4   Inpatient consult to Endocrinology  Consult performed by: Petros Lange MD  Consult ordered by: Pepe Conway MD        Physician Requesting Evaluation: Pepe Conway MD   Reason for Evaluation / Principal Problem: Steroid taper management in Presumed adrenal insufficiency     Assessment & Plan  Adrenal insufficiency (HCC)  History of presumed adrenal sufficiency in the setting of hypotension during a recent admission --discharged on hydrocortisone 15 g in the morning and 5 mg in the afternoon  Patient presented hypotensive in the setting of extensive DVT --was stress dosed on presentation  Per primary, has been tapered to IV hydrocortisone 50 mg every 12 hours  Hemodynamically patient remained stable    Continue taper with oral hydrocortisone BID (morning and afternoon) as 45 mg and 15 mg x 2 days, then 35 mg and 15 mg x 2 days, then 30 mg and 10 mg x 2 days, and then return to home regimen of hydrocortisone 15 mg in the morning and 5 mg in the afternoon  In the outpatient setting, evaluate for true adrenal insufficiency with ACTH stimulation test  Outpatient follow-up with endocrinology  Hypothyroidism  Continue PTA levothyroxine 100 mcg daily  Down syndrome  Down syndrome associated with increased risk of development of endocrinopathy such as hypothyroidism and adrenal insufficiency  Management as primary  Endocrinology service will follow.    History of Present Illness   Rogelio Guerra is a 59 y.o. male history of presumed adrenal insufficiency, hypothyroidism, Down syndrome, and dementia who presents to hospital with change in mental status.  On presentation, was noted to be hypotensive, which was responsive to fluids, and evaluate showed no noted acute IVC, B/L iliac, and L femoral thrombosis.   Endocrinology is consulted for assistance with steroid tapering after stress dose steroids for management of hypotension in a patient with presumed adrenal insufficiency.    Patient is unable to provide history.  Per chart review, hospitalization at [facility], mom discharged to February 10, 2025, during which time he was managed for AMS and hypotension, in the setting of sepsis, and stress dose steroid were started.  Given concern for adrenal insufficiency, during hospitalization, [facility] endocrinology was consulted.  An attempt was made to performed cosyntropin stimulation test, however, after cosyntropin the following blood draws were not done in a timely manner (at 2 hours after cosyntropin cortisol was 17) and therefore adrenal insufficiency could not be ruled out.  Patient was planned to be followed in the outpatient setting with outpatient cosyntropin stimulation test.    Review of Systems   Unable to perform ROS: Patient nonverbal     Medical History Review: I have reviewed the patient's PMH, PSH, Social History, Family History, Meds, and Allergies     Objective :  Temp:  [97.5 °F (36.4 °C)-99 °F (37.2 °C)] 99 °F (37.2 °C)  HR:  [] 103  BP: ()/(55-71) 115/67  Resp:  [16-18] 16  SpO2:  [95 %-100 %] 97 %  O2 Device: None (Room air)    Physical Exam  Vitals reviewed.   Constitutional:       General: He is sleeping. He is not in acute distress.     Appearance: Normal appearance. He is overweight. He is not ill-appearing.   HENT:      Head: Normocephalic and atraumatic.      Mouth/Throat:      Mouth: Mucous membranes are dry.   Eyes:      General: No scleral icterus.     Conjunctiva/sclera: Conjunctivae normal.   Cardiovascular:      Rate and Rhythm: Normal rate and regular rhythm.   Pulmonary:      Effort: Pulmonary effort is normal. No respiratory distress.   Abdominal:      General: There is no distension.   Skin:     General: Skin is dry.      Capillary Refill: Capillary refill takes less than 2 seconds.       Coloration: Skin is not jaundiced or pale.   Neurological:      Mental Status: He is easily aroused. Mental status is at baseline.   Psychiatric:         Behavior: Behavior normal.         Lab Results: I have reviewed the following results:CBC/BMP:   .     03/16/25  1851 03/17/25  0716   WBC  --  7.55   HGB 10.2* 10.6*   HCT 30* 34.4*   PLT  --  302   SODIUM  --  141   K  --  3.6   CL  --  107   CO2 30 30   BUN  --  8   CREATININE  --  0.69   GLUC  --  117   CAIONIZED 1.23  --    MG  --  1.8*   PHOS  --  2.6            Imaging Results Review: I reviewed radiology reports from this admission including: CT abdomen/pelvis.  Other Study Results Review: No additional pertinent studies reviewed.        Petros Lange MD  Endocrinology fellow, PGY-4

## 2025-03-18 LAB
ANION GAP SERPL CALCULATED.3IONS-SCNC: 7 MMOL/L (ref 4–13)
BACTERIA BLD CULT: NORMAL
BACTERIA BLD CULT: NORMAL
BASOPHILS # BLD AUTO: 0.01 THOUSANDS/ÂΜL (ref 0–0.1)
BASOPHILS NFR BLD AUTO: 0 % (ref 0–1)
BUN SERPL-MCNC: 6 MG/DL (ref 5–25)
CALCIUM SERPL-MCNC: 8.1 MG/DL (ref 8.4–10.2)
CHLORIDE SERPL-SCNC: 105 MMOL/L (ref 96–108)
CO2 SERPL-SCNC: 28 MMOL/L (ref 21–32)
CREAT SERPL-MCNC: 0.55 MG/DL (ref 0.6–1.3)
EOSINOPHIL # BLD AUTO: 0.02 THOUSAND/ÂΜL (ref 0–0.61)
EOSINOPHIL NFR BLD AUTO: 0 % (ref 0–6)
ERYTHROCYTE [DISTWIDTH] IN BLOOD BY AUTOMATED COUNT: 19.7 % (ref 11.6–15.1)
GFR SERPL CREATININE-BSD FRML MDRD: 112 ML/MIN/1.73SQ M
GLUCOSE SERPL-MCNC: 130 MG/DL (ref 65–140)
HCT VFR BLD AUTO: 32.1 % (ref 36.5–49.3)
HGB BLD-MCNC: 10.2 G/DL (ref 12–17)
IMM GRANULOCYTES # BLD AUTO: 0.04 THOUSAND/UL (ref 0–0.2)
IMM GRANULOCYTES NFR BLD AUTO: 1 % (ref 0–2)
LYMPHOCYTES # BLD AUTO: 1.72 THOUSANDS/ÂΜL (ref 0.6–4.47)
LYMPHOCYTES NFR BLD AUTO: 22 % (ref 14–44)
MAGNESIUM SERPL-MCNC: 1.7 MG/DL (ref 1.9–2.7)
MCH RBC QN AUTO: 30.8 PG (ref 26.8–34.3)
MCHC RBC AUTO-ENTMCNC: 31.8 G/DL (ref 31.4–37.4)
MCV RBC AUTO: 97 FL (ref 82–98)
MONOCYTES # BLD AUTO: 0.59 THOUSAND/ÂΜL (ref 0.17–1.22)
MONOCYTES NFR BLD AUTO: 8 % (ref 4–12)
NEUTROPHILS # BLD AUTO: 5.39 THOUSANDS/ÂΜL (ref 1.85–7.62)
NEUTS SEG NFR BLD AUTO: 69 % (ref 43–75)
NRBC BLD AUTO-RTO: 0 /100 WBCS
PLATELET # BLD AUTO: 309 THOUSANDS/UL (ref 149–390)
PMV BLD AUTO: 10.2 FL (ref 8.9–12.7)
POTASSIUM SERPL-SCNC: 3.1 MMOL/L (ref 3.5–5.3)
RBC # BLD AUTO: 3.31 MILLION/UL (ref 3.88–5.62)
SODIUM SERPL-SCNC: 140 MMOL/L (ref 135–147)
WBC # BLD AUTO: 7.77 THOUSAND/UL (ref 4.31–10.16)

## 2025-03-18 PROCEDURE — 85025 COMPLETE CBC W/AUTO DIFF WBC: CPT | Performed by: FAMILY MEDICINE

## 2025-03-18 PROCEDURE — 80048 BASIC METABOLIC PNL TOTAL CA: CPT | Performed by: FAMILY MEDICINE

## 2025-03-18 PROCEDURE — 99232 SBSQ HOSP IP/OBS MODERATE 35: CPT | Performed by: STUDENT IN AN ORGANIZED HEALTH CARE EDUCATION/TRAINING PROGRAM

## 2025-03-18 RX ORDER — POTASSIUM CHLORIDE 1500 MG/1
40 TABLET, EXTENDED RELEASE ORAL ONCE
Status: DISCONTINUED | OUTPATIENT
Start: 2025-03-18 | End: 2025-03-18

## 2025-03-18 RX ORDER — HYDROCORTISONE 5 MG/1
TABLET ORAL
Qty: 43 TABLET | Refills: 0 | Status: SHIPPED | OUTPATIENT
Start: 2025-03-18 | End: 2025-04-21

## 2025-03-18 RX ORDER — POTASSIUM CHLORIDE 1500 MG/1
40 TABLET, EXTENDED RELEASE ORAL 2 TIMES DAILY
Status: COMPLETED | OUTPATIENT
Start: 2025-03-18 | End: 2025-03-18

## 2025-03-18 RX ORDER — HYDROCORTISONE 5 MG/1
TABLET ORAL
Qty: 125 TABLET | Refills: 0 | Status: SHIPPED | OUTPATIENT
Start: 2025-03-19 | End: 2025-04-23

## 2025-03-18 RX ORDER — MAGNESIUM SULFATE HEPTAHYDRATE 40 MG/ML
2 INJECTION, SOLUTION INTRAVENOUS ONCE
Status: COMPLETED | OUTPATIENT
Start: 2025-03-18 | End: 2025-03-18

## 2025-03-18 RX ORDER — POTASSIUM CHLORIDE 750 MG/1
20 CAPSULE, EXTENDED RELEASE ORAL DAILY
Qty: 60 CAPSULE | Refills: 0 | Status: SHIPPED | OUTPATIENT
Start: 2025-03-18

## 2025-03-18 RX ADMIN — MAGNESIUM SULFATE HEPTAHYDRATE 2 G: 40 INJECTION, SOLUTION INTRAVENOUS at 15:52

## 2025-03-18 RX ADMIN — HYDROCORTISONE 45 MG: 20 TABLET ORAL at 08:27

## 2025-03-18 RX ADMIN — PANTOPRAZOLE SODIUM 20 MG: 20 TABLET, DELAYED RELEASE ORAL at 05:07

## 2025-03-18 RX ADMIN — FAMOTIDINE 20 MG: 20 TABLET, FILM COATED ORAL at 08:26

## 2025-03-18 RX ADMIN — HYDROCORTISONE: 1 CREAM TOPICAL at 17:11

## 2025-03-18 RX ADMIN — POTASSIUM CHLORIDE 40 MEQ: 1500 TABLET, EXTENDED RELEASE ORAL at 17:09

## 2025-03-18 RX ADMIN — APIXABAN 5 MG: 5 TABLET, FILM COATED ORAL at 17:09

## 2025-03-18 RX ADMIN — LEVOTHYROXINE SODIUM 100 MCG: 100 TABLET ORAL at 05:07

## 2025-03-18 RX ADMIN — HYDROCORTISONE: 1 CREAM TOPICAL at 08:27

## 2025-03-18 RX ADMIN — MIRTAZAPINE 15 MG: 15 TABLET, FILM COATED ORAL at 22:35

## 2025-03-18 RX ADMIN — POTASSIUM CHLORIDE 40 MEQ: 1500 TABLET, EXTENDED RELEASE ORAL at 10:54

## 2025-03-18 RX ADMIN — ATORVASTATIN CALCIUM 20 MG: 20 TABLET, FILM COATED ORAL at 08:26

## 2025-03-18 RX ADMIN — POLYETHYLENE GLYCOL 3350 17 G: 17 POWDER, FOR SOLUTION ORAL at 08:27

## 2025-03-18 RX ADMIN — APIXABAN 10 MG: 5 TABLET, FILM COATED ORAL at 08:26

## 2025-03-18 RX ADMIN — HYDROCORTISONE 15 MG: 10 TABLET ORAL at 17:10

## 2025-03-18 RX ADMIN — FAMOTIDINE 20 MG: 20 TABLET, FILM COATED ORAL at 17:09

## 2025-03-18 NOTE — PROGRESS NOTES
Progress Note - Hospitalist   Name: Rogelio Guerra 59 y.o. male I MRN: [id number]  Unit/Bed#: [facility] 528-01 I Date of Admission: 3/13/2025   Date of Service: 3/18/2025 I Hospital Day: 5    Assessment & Plan  Acute extensive venous thrombosis  Transfer from senior care/[facility] for altered mental status.  Recently hospitalized  at [facility] and discharged on 3/6/2025, he was diagnosed with sepsis and received treatment for pneumonia and completed antibiotics during his hospital stay.  Workup here in  ED with CT chest abdomen pelvis revealed extensive thrombosis of the IVC, bilateral iliac and left femoral veins  CT PE study- no PE  Lower extremity venous Dopplers- bilateral- Evidence of non-occlusive deep venous thrombosis in the common femoral, femoral, and popliteal veins.  Discussed with IR to evaluate for clot removal versus thrombolysis- per IR  there is no plan for invasive procedure/thrombectomy  Was seen by heme-onc. Because there are no invasive procedures planned, could consider switching to DOAC lifelong   A script for eliquis was sent to [facility] for a price check, [facility] and script is free (not just for first month but they said its free ongoing).   Continue Eliquis 3/15  Patient medically stable for discharge.  Plan was for DC today however group home declined admission due to their preferred pharmacy not delivering medications prior to noon  Adrenal insufficiency (HCC)  Endocrinology recommending taper the steroids to the home dose over the next week. Hydrocortisone should be dose at 8am and 3pm. For two days 45 mg and 15 mg (2 days), then 35 mg and 15 mg (2 days), then 30 mg and 10 mg (2 days), and then return to home regimen of hydrocortisone 15 mg in the morning and 5 mg in the afternoon   Altered mental status  Presents with altered mental status, has hx Down syndrome and resides at senior care  CT head- No acute intracranial abnormality. Chronic microangiopathic changes and  moderate central greater than cortical volume loss  Infection workup including UA negative. Blood cultures negative at 24 hours  Avoid neurotoxins  Optimize metabolic parameters  Previous provider spoke with nurse at shelter. She states he has lived at the shelter but more recently he was admitted to a nursing home for rehab after a hospitalization and it appears he developed an open sacral wound and had some decline during that time period  He is bedridden at baseline  He does eat a soft diet and takes his pills well at home  Avoid narcotics as patient sensitive to sedative effects of opioids  Resolved  Sacral ulcer (HCC)  PTA sacral ulcer  Staff at [facility] states he developed this during a recent hospitalization/stay in nursing home for rehab  Being followed by wound care  Continue local care  Outpatient wound care referral placed  Down syndrome  Patient with history of Down syndrome and diagnosis of early onset dementia  Supportive care  Hypothyroidism  TSH 5.657 free T4 1.33  [facility] records document he was on levothyroxine 88 mcg daily but hospital records from recent admissions to [facility] in Jan and March both say he should be on 100 mcg so dose adjusted  Monitor TSH in 4 to 6 weeks and outpatient follow-up  Anxiety  Continue mirtazapine  Hypokalemia  Stable, continue to monitor and replete as needed    VTE Pharmacologic Prophylaxis: VTE Score: 8 High Risk (Score >/= 5) - Pharmacological DVT Prophylaxis Ordered: apixaban (Eliquis). Sequential Compression Devices Ordered.    Mobility:   Basic Mobility Inpatient Raw Score: 7  JH-HLM Goal: 2: Bed activities/Dependent transfer  JH-HLM Achieved: 2: Bed activities/Dependent transfer  JH-HLM Goal achieved. Continue to encourage appropriate mobility.    Patient Centered Rounds: I performed bedside rounds with nursing staff today.   Discussions with Specialists or Other Care Team Provider:     Education and Discussions with Family / Patient:  Will  update group home as able.     Current Length of Stay: 5 day(s)  Current Patient Status: Inpatient   Certification Statement: The patient will continue to require additional inpatient hospital stay due to dispo planning  Discharge Plan: Anticipate discharge tomorrow to residential.    Code Status: Level 1 - Full Code    Subjective   No events overnight.  Patient resting comfortably.    Objective :  Temp:  [98.1 °F (36.7 °C)-99 °F (37.2 °C)] 98.1 °F (36.7 °C)  HR:  [] 88  BP: ()/(65-70) 97/65  Resp:  [16-18] 17  SpO2:  [97 %-100 %] 97 %  O2 Device: None (Room air)    Body mass index is 28.88 kg/m².     Input and Output Summary (last 24 hours):     Intake/Output Summary (Last 24 hours) at 3/18/2025 1416  Last data filed at 3/18/2025 0837  Gross per 24 hour   Intake 538 ml   Output 1334 ml   Net -796 ml       Physical Exam  Vitals and nursing note reviewed.   Constitutional:       General: He is not in acute distress.     Appearance: He is well-developed.   HENT:      Head: Normocephalic and atraumatic.   Eyes:      Conjunctiva/sclera: Conjunctivae normal.   Cardiovascular:      Rate and Rhythm: Normal rate and regular rhythm.   Pulmonary:      Effort: No respiratory distress.      Breath sounds: No wheezing or rhonchi.   Abdominal:      Palpations: Abdomen is soft.      Tenderness: There is no abdominal tenderness.   Musculoskeletal:         General: No swelling.      Cervical back: Neck supple.   Skin:     General: Skin is warm and dry.   Neurological:      Mental Status: He is alert.           Lines/Drains:  Lines/Drains/Airways       Active Status       Name Placement date Placement time Site Days    External Urinary Catheter 03/13/25  2100  -- 4                            Lab Results: I have reviewed the following results:   Results from last 7 days   Lab Units 03/18/25  0530   WBC Thousand/uL 7.77   HEMOGLOBIN g/dL 10.2*   HEMATOCRIT % 32.1*   PLATELETS Thousands/uL 309   SEGS PCT % 69   LYMPHO PCT %  22   MONO PCT % 8   EOS PCT % 0     Results from last 7 days   Lab Units 03/18/25  0445 03/17/25  0716   SODIUM mmol/L 140 141   POTASSIUM mmol/L 3.1* 3.6   CHLORIDE mmol/L 105 107   CO2 mmol/L 28 30   BUN mg/dL 6 8   CREATININE mg/dL 0.55* 0.69   ANION GAP mmol/L 7 4   CALCIUM mg/dL 8.1* 7.6*   ALBUMIN g/dL  --  2.3*   TOTAL BILIRUBIN mg/dL  --  0.30   ALK PHOS U/L  --  62   ALT U/L  --  27   AST U/L  --  12*   GLUCOSE RANDOM mg/dL 130 117     Results from last 7 days   Lab Units 03/13/25  0723   INR  1.09     Results from last 7 days   Lab Units 03/16/25  1840   POC GLUCOSE mg/dl 124         Results from last 7 days   Lab Units 03/16/25  1847 03/13/25  1019 03/13/25  0723   LACTIC ACID mmol/L 1.3 0.9 2.3*   PROCALCITONIN ng/ml  --   --  0.16       Recent Cultures (last 7 days):   Results from last 7 days   Lab Units 03/13/25  0736 03/13/25  0723   BLOOD CULTURE  No Growth After 4 Days. No Growth After 4 Days.       Imaging Results Review: No pertinent imaging studies reviewed.  Other Study Results Review: No additional pertinent studies reviewed.    Last 24 Hours Medication List:     Current Facility-Administered Medications:     acetaminophen (TYLENOL) tablet 650 mg, Q6H PRN    aluminum-magnesium hydroxide-simethicone (MAALOX) oral suspension 30 mL, Q6H PRN    [COMPLETED] apixaban (ELIQUIS) tablet 10 mg, BID **FOLLOWED BY** apixaban (ELIQUIS) tablet 5 mg, BID    atorvastatin (LIPITOR) tablet 20 mg, Daily    famotidine (PEPCID) tablet 20 mg, BID    hydrocortisone (CORTEF) tablet 15 mg, QPM **FOLLOWED BY** [START ON 3/21/2025] hydrocortisone (CORTEF) tablet 10 mg, QPM **FOLLOWED BY** [START ON 3/23/2025] hydrocortisone (CORTEF) tablet 5 mg, QPM    hydrocortisone (CORTEF) tablet 45 mg, QAM **FOLLOWED BY** [START ON 3/20/2025] hydrocortisone (CORTEF) tablet 35 mg, QAM **FOLLOWED BY** [START ON 3/22/2025] hydrocortisone (CORTEF) tablet 30 mg, QAM **FOLLOWED BY** [START ON 3/24/2025] hydrocortisone (CORTEF) tablet 15  mg, QAM    hydrocortisone 1 % cream, BID    levothyroxine tablet 100 mcg, Early Morning    mirtazapine (REMERON) tablet 15 mg, HS    naloxone (NARCAN) 0.04 mg/mL syringe 0.04 mg, Q1MIN PRN    ondansetron (ZOFRAN) injection 4 mg, Q6H PRN    pantoprazole (PROTONIX) EC tablet 20 mg, Early Morning    polyethylene glycol (MIRALAX) packet 17 g, Daily    potassium chloride (Klor-Con M20) CR tablet 40 mEq, BID    white petrolatum-mineral oil (EUCERIN,HYDROCERIN) cream, TID PRN    Administrative Statements   Today, Patient Was Seen By: Tamra Galo MD      **Please Note: This note may have been constructed using a voice recognition system.**

## 2025-03-18 NOTE — ASSESSMENT & PLAN NOTE
PTA sacral ulcer  Staff at [facility] states he developed this during a recent hospitalization/stay in nursing home for rehab  Being followed by wound care  Continue local care  Outpatient wound care referral placed

## 2025-03-18 NOTE — DISCHARGE INSTR - AVS FIRST PAGE
Hydrocortisone should be dose at 8am and 3pm. For two days 45 mg and 15 mg (2 days), then 35 mg and 15 mg (2 days), then 30 mg and 10 mg (2 days), and then return to home regimen of hydrocortisone 15 mg in the morning and 5 mg in the afternoon       Pt can resume medications other than those changed during admission.

## 2025-03-18 NOTE — ASSESSMENT & PLAN NOTE
Transfer from longterm/life Path for altered mental status.  Recently hospitalized  at [facility] and discharged on 3/6/2025, he was diagnosed with sepsis and received treatment for pneumonia and completed antibiotics during his hospital stay.  Workup here in  ED with CT chest abdomen pelvis revealed extensive thrombosis of the IVC, bilateral iliac and left femoral veins  CT PE study- no PE  Lower extremity venous Dopplers- bilateral- Evidence of non-occlusive deep venous thrombosis in the common femoral, femoral, and popliteal veins.  Discussed with IR to evaluate for clot removal versus thrombolysis- per IR  there is no plan for invasive procedure/thrombectomy  Was seen by heme-onc. Because there are no invasive procedures planned, could consider switching to DOAC lifelong   A script for eliquis was sent to [employer] for a price check, [facility] and script is free (not just for first month but they said its free ongoing).   Continue Eliquis 3/15  Patient medically stable for discharge.  Plan was for DC today however group home declined admission due to their preferred pharmacy not delivering medications prior to noon

## 2025-03-18 NOTE — ASSESSMENT & PLAN NOTE
Endocrinology recommending taper the steroids to the home dose over the next week. Hydrocortisone should be dose at 8am and 3pm. For two days 45 mg and 15 mg (2 days), then 35 mg and 15 mg (2 days), then 30 mg and 10 mg (2 days), and then return to home regimen of hydrocortisone 15 mg in the morning and 5 mg in the afternoon

## 2025-03-18 NOTE — CASE MANAGEMENT
Case Management Discharge Planning Note    Patient name Rogelio Guerra  Location [facility] 528-01 MRN [id number]  : 1965 Date 3/18/2025       Current Admission Date: 3/13/2025  Current Admission Diagnosis:Acute extensive venous thrombosis   Patient Active Problem List    Diagnosis Date Noted Date Diagnosed    Hypokalemia 2025     Sacral ulcer (HCC) 2025     Altered mental status 2025     Down syndrome 2025     Hypothyroidism 2025     Adrenal insufficiency (HCC) 2025     Acute extensive venous thrombosis 2025     Anxiety 2025       LOS (days): 5  Geometric Mean LOS (GMLOS) (days): 3.4  Days to GMLOS:-1.5     OBJECTIVE:  Risk of Unplanned Readmission Score: 13.12         Current admission status: Inpatient   Preferred Pharmacy:   [facility] [place], [place] - [address]  [address]  [place] [address]  Phone: [phone] Fax: [phone]    [place], [place] - [address]  [address] [facility] [address]  Phone: [phone] Fax: [phone]    [facility] [place] - [place], [place] - [address]  [address]  [place] [address]  Phone: [phone] Fax: [phone]    Primary Care Provider: Jared Tee MD    Primary Insurance: MEDICARE  Secondary Insurance: [place] MEDICAL ASSISTANCE    DISCHARGE DETAILS:    Discharge planning discussed with:: Jose RN at [facility]  [place] of Choice: Yes     CM contacted family/caregiver?: No- see comments  Were Treatment Team discharge recommendations reviewed with patient/caregiver?: Yes  Did patient/caregiver verbalize understanding of patient care needs?: Yes  Were patient/caregiver advised of the risks associated with not following Treatment Team discharge recommendations?: Yes    Contacts  Patient Contacts: Zoya Ricardo RN  Relationship to Patient:: Treatment Provider  Contact  Method: Phone  Phone Number: [phone]  Reason/Outcome: Continuity of Care, Emergency Contact, Discharge Planning       Other Referral/Resources/Interventions Provided:  Interventions: Group Home    Treatment Team Recommendation: Group Home  Discharge Destination Plan:: Group Home   IMM Given (Date):: 03/18/25..IMM reviewed with patient's caregiver, patient and caregiver agrees with discharge determination.    Accepting Facility Name, City & State : [facility] [phone]  Receiving Facility/Agency Phone Number: [phone]- Zoya Khan RN  Facility/Agency Fax Number: [phone] Zoya glasgow or [phone]   Pending a p/u time from Group Home.    UPDATE:  RAE Hill from [facility] is unable to accept patient today.  She will let me know when the new medications have been delivered to them.  Carmen/RN on duty aware d/c cancelled today.

## 2025-03-18 NOTE — ASSESSMENT & PLAN NOTE
Presents with altered mental status, has hx Down syndrome and resides at shelter  CT head- No acute intracranial abnormality. Chronic microangiopathic changes and moderate central greater than cortical volume loss  Infection workup including UA negative. Blood cultures negative at 24 hours  Avoid neurotoxins  Optimize metabolic parameters  Previous provider spoke with nurse at shelter. She states he has lived at the shelter but more recently he was admitted to a nursing home for rehab after a hospitalization and it appears he developed an open sacral wound and had some decline during that time period  He is bedridden at baseline  He does eat a soft diet and takes his pills well at home  Avoid narcotics as patient sensitive to sedative effects of opioids  Resolved

## 2025-03-18 NOTE — ASSESSMENT & PLAN NOTE
TSH 5.657 free T4 1.33  [facility] records document he was on levothyroxine 88 mcg daily but hospital records from recent admissions to [facility] in Jan and March both say he should be on 100 mcg so dose adjusted  Monitor TSH in 4 to 6 weeks and outpatient follow-up   The patient is a 17y Male complaining of abdominal pain.

## 2025-03-19 VITALS
HEIGHT: 60 IN | HEART RATE: 99 BPM | BODY MASS INDEX: 28.07 KG/M2 | DIASTOLIC BLOOD PRESSURE: 84 MMHG | SYSTOLIC BLOOD PRESSURE: 140 MMHG | WEIGHT: 143 LBS | OXYGEN SATURATION: 94 % | TEMPERATURE: 98.6 F | RESPIRATION RATE: 19 BRPM

## 2025-03-19 LAB — POTASSIUM SERPL-SCNC: 3.2 MMOL/L (ref 3.5–5.3)

## 2025-03-19 PROCEDURE — 84132 ASSAY OF SERUM POTASSIUM: CPT | Performed by: STUDENT IN AN ORGANIZED HEALTH CARE EDUCATION/TRAINING PROGRAM

## 2025-03-19 PROCEDURE — 99239 HOSP IP/OBS DSCHRG MGMT >30: CPT | Performed by: STUDENT IN AN ORGANIZED HEALTH CARE EDUCATION/TRAINING PROGRAM

## 2025-03-19 RX ORDER — POTASSIUM CHLORIDE 1500 MG/1
40 TABLET, EXTENDED RELEASE ORAL ONCE
Status: COMPLETED | OUTPATIENT
Start: 2025-03-19 | End: 2025-03-19

## 2025-03-19 RX ADMIN — POLYETHYLENE GLYCOL 3350 17 G: 17 POWDER, FOR SOLUTION ORAL at 09:02

## 2025-03-19 RX ADMIN — FAMOTIDINE 20 MG: 20 TABLET, FILM COATED ORAL at 08:54

## 2025-03-19 RX ADMIN — FAMOTIDINE 20 MG: 20 TABLET, FILM COATED ORAL at 17:24

## 2025-03-19 RX ADMIN — HYDROCORTISONE 15 MG: 10 TABLET ORAL at 17:24

## 2025-03-19 RX ADMIN — ATORVASTATIN CALCIUM 20 MG: 20 TABLET, FILM COATED ORAL at 08:54

## 2025-03-19 RX ADMIN — HYDROCORTISONE: 1 CREAM TOPICAL at 08:54

## 2025-03-19 RX ADMIN — HYDROCORTISONE 1 APPLICATION: 1 CREAM TOPICAL at 17:29

## 2025-03-19 RX ADMIN — APIXABAN 5 MG: 5 TABLET, FILM COATED ORAL at 17:24

## 2025-03-19 RX ADMIN — POTASSIUM CHLORIDE 40 MEQ: 1500 TABLET, EXTENDED RELEASE ORAL at 10:51

## 2025-03-19 RX ADMIN — PANTOPRAZOLE SODIUM 20 MG: 20 TABLET, DELAYED RELEASE ORAL at 05:24

## 2025-03-19 RX ADMIN — HYDROCORTISONE 45 MG: 20 TABLET ORAL at 10:51

## 2025-03-19 RX ADMIN — APIXABAN 5 MG: 5 TABLET, FILM COATED ORAL at 08:54

## 2025-03-19 RX ADMIN — LEVOTHYROXINE SODIUM 100 MCG: 100 TABLET ORAL at 05:24

## 2025-03-19 NOTE — ASSESSMENT & PLAN NOTE
Transferred from [facility] for altered mental status.  Recently hospitalized  at [facility] and discharged on 3/6/2025, he was diagnosed with sepsis and received treatment for pneumonia and completed antibiotics during his hospital stay.  Workup here in  ED with CT chest abdomen pelvis revealed extensive thrombosis of the IVC, bilateral iliac and left femoral veins  CT PE study- no PE  Lower extremity venous Dopplers- bilateral- Evidence of non-occlusive deep venous thrombosis in the common femoral, femoral, and popliteal veins.  Discussed with IR to evaluate for clot removal versus thrombolysis- per IR  there is no plan for invasive procedure/thrombectomy  Was seen by heme-onc. Because there are no invasive procedures planned, could consider switching to DOAC lifelong   A script for eliquis was sent to [facility] for a price check, called down to [facility] and script is free (not just for first month but they said its free ongoing).   Continue Eliquis 3/15-prescription was sent electronically and faxed to patient's group home preferred pharmacy

## 2025-03-19 NOTE — ASSESSMENT & PLAN NOTE
TSH 5.657 free T4 1.33  [facility] records document he was on levothyroxine 88 mcg daily but hospital records from recent admissions to [facility] in Jan and March both say he should be on 100 mcg so dose adjusted  Monitor TSH in 4 to 6 weeks and outpatient follow-up

## 2025-03-19 NOTE — DISCHARGE SUMMARY
Discharge Summary - Hospitalist   Name: Rogelio Guerra 59 y.o. male I MRN: [id number]  Unit/Bed#: [facility] 524-01 I Date of Admission: 3/13/2025   Date of Service: 3/19/2025 I Hospital Day: 6     Assessment & Plan  Acute extensive venous thrombosis  Transferred from group home/[facility] for altered mental status.  Recently hospitalized  at [facility] and discharged on 3/6/2025, he was diagnosed with sepsis and received treatment for pneumonia and completed antibiotics during his hospital stay.  Workup here in  ED with CT chest abdomen pelvis revealed extensive thrombosis of the IVC, bilateral iliac and left femoral veins  CT PE study- no PE  Lower extremity venous Dopplers- bilateral- Evidence of non-occlusive deep venous thrombosis in the common femoral, femoral, and popliteal veins.  Discussed with IR to evaluate for clot removal versus thrombolysis- per IR  there is no plan for invasive procedure/thrombectomy  Was seen by heme-onc. Because there are no invasive procedures planned, could consider switching to DOAC lifelong   A script for eliquis was sent to [facility] [facility] for a price check, called down to [facility] and script is free (not just for first month but they said its free ongoing).   Continue Eliquis 3/15-prescription was sent electronically and faxed to patient's group home preferred pharmacy  Adrenal insufficiency (HCC)  Endocrinology recommending taper the steroids to the home dose over the next week. Hydrocortisone should be dose at 8am and 3pm. For two days 45 mg and 15 mg (2 days), then 35 mg and 15 mg (2 days), then 30 mg and 10 mg (2 days), and then return to home regimen of hydrocortisone 15 mg in the morning and 5 mg in the afternoon   Altered mental status  Presents with altered mental status, has hx [facility]  [facility] head- No acute intracranial abnormality. Chronic microangiopathic changes and moderate central greater than cortical volume loss  Infection workup including UA  negative. Blood cultures negative  Previous provider spoke with nurse at [facility]. She states he has lived at the [facility] but more recently he was admitted to a nursing home for rehab after a hospitalization and it appears he developed an open sacral wound and had some decline during that time period. He is bedridden at baseline  He does eat a soft diet and takes his pills well at home  Avoid narcotics as patient sensitive to sedative effects of opioids  Resolved  Sacral ulcer (HCC)  POA  Staff at [facility] states he developed this during a recent hospitalization/stay in nursing home for rehab  Being followed by wound care  Continue local care  Outpatient wound care referral placed at discharge  Down syndrome  Supportive care  Hypothyroidism  TSH 5.657 free T4 1.33  [facility] records document he was on levothyroxine 88 mcg daily but hospital records from recent admissions to [facility] in Jan and March both say he should be on 100 mcg so dose adjusted  Monitor TSH in 4 to 6 weeks and outpatient follow-up  Anxiety  Continue mirtazapine  Hypokalemia  Discharged with daily potassium     Medical Problems       Resolved Problems  Date Reviewed: 3/13/2025   None       Discharging Physician / Practitioner: Tamra Galo MD  PCP: Jared Tee MD  Admission Date:   Admission Orders (From admission, onward)       Ordered        03/13/25 1401  Inpatient Admission  Once                          Discharge Date: 03/19/25    Consultations During Hospital Stay:  Oncology, IR, endocrinology    Procedures Performed:   None    Significant Findings / Test Results:   Acute DVT     Incidental Findings:   None    Test Results Pending at Discharge (will require follow up):   None     Outpatient Tests Requested:  None    Complications:  None    Reason for Admission: Altered mental status    Hospital Course:   Rogelio Guerra is a 59 y.o. male patient who originally presented to the hospital on 3/13/2025 due to altered mental status  "at his group home.  On admission infectious workup was negative however imaging revealed extensive thrombosis of the IVC, bilateral iliac and left femoral veins.  CT PE was negative for pulmonary embolism.  Patient was evaluated by IR who after reviewing lower extremity Dopplers with nonocclusive DVTs recommended no invasive procedures at this time.  Patient was evaluated by hematology/oncology who recommended lifelong anticoagulation and patient was started on Eliquis.  His mentation returned to baseline at the time of discharge.  He was also evaluated by endocrinology who adjusted his adrenal insufficiency medications.    Please see above list of diagnoses and related plan for additional information.     Condition at Discharge: stable    Discharge Day Visit / Exam:   Subjective: No events overnight.  Patient is resting comfortably in bed, watching TV.  Tolerating oral intake.  Vitals: Blood Pressure: 140/84 (03/19/25 1515)  Pulse: 99 (03/19/25 1515)  Temperature: 98.6 °F (37 °C) (03/19/25 1515)  Temp Source: Axillary (03/19/25 1515)  Respirations: 19 (03/19/25 1515)  Height: 4' 11\" (149.9 cm) (03/13/25 1358)  Weight - Scale: 64.9 kg (143 lb) (03/13/25 0805)  SpO2: 94 % (03/19/25 1515)  Physical Exam  Vitals and nursing note reviewed.   Constitutional:       General: He is not in acute distress.     Appearance: He is well-developed.   HENT:      Head: Normocephalic and atraumatic.   Eyes:      Conjunctiva/sclera: Conjunctivae normal.   Cardiovascular:      Rate and Rhythm: Normal rate and regular rhythm.   Pulmonary:      Effort: No respiratory distress.      Breath sounds: Normal breath sounds. No wheezing or rhonchi.   Abdominal:      Palpations: Abdomen is soft.      Tenderness: There is no abdominal tenderness.   Musculoskeletal:         General: No swelling.      Cervical back: Neck supple.   Skin:     General: Skin is warm and dry.   Neurological:      Mental Status: He is alert.          Discussion with " Family: Attempted to update  (caregiver Tiffany) via phone. Unable to contact.    Discharge instructions/Information to patient and family:   See after visit summary for information provided to patient and family.      Provisions for Follow-Up Care:  See after visit summary for information related to follow-up care and any pertinent home health orders.      Mobility at time of Discharge:   Basic Mobility Inpatient Raw Score: 7  JH-HLM Goal: 2: Bed activities/Dependent transfer  JH-HLM Achieved: 2: Bed activities/Dependent transfer  HLM Goal achieved. Continue to encourage appropriate mobility.     Disposition:   Group Home / Personal Care Home at [facility]    Planned Readmission: no    Discharge Medications:  See after visit summary for reconciled discharge medications provided to patient and/or family.      Administrative Statements   Discharge Statement:  I have spent a total time of 40 minutes in caring for this patient on the day of the visit/encounter. >30 minutes of time was spent on: Instructions for management, Counseling / Coordination of care, Documenting in the medical record, Reviewing / ordering tests, medicine, procedures  , and Communicating with other healthcare professionals .    **Please Note: This note may have been constructed using a voice recognition system**

## 2025-03-19 NOTE — CASE MANAGEMENT
Case Management Discharge Planning Note    Patient name Rogelio Guerra  Location [facility]-01 MRN [id number]  : 1965 Date 3/19/2025       Current Admission Date: 3/13/2025  Current Admission Diagnosis:Acute extensive venous thrombosis   Patient Active Problem List    Diagnosis Date Noted Date Diagnosed    Hypokalemia 2025     Sacral ulcer (HCC) 2025     Altered mental status 2025     Down syndrome 2025     Hypothyroidism 2025     Adrenal insufficiency (HCC) 2025     Acute extensive venous thrombosis 2025     Anxiety 2025       LOS (days): 6  Geometric Mean LOS (GMLOS) (days): 3.4  Days to GMLOS:-2.4     OBJECTIVE:  Risk of Unplanned Readmission Score: 13.43         Current admission status: Inpatient   Preferred Pharmacy:   [place], [place] - [address]  [address] [facility] [address]  Phone: [phone] Fax: [phone]    [place], [place] - [address]  [address] [facility] [address]  Phone: [phone] Fax: [phone]    [facility] - [place] - [address]  [address]  [place] [address]  Phone: [phone] Fax: [phone]    Primary Care Provider: Jared Tee MD    Primary Insurance: MEDICARE  Secondary Insurance: [place] [employer]    DISCHARGE DETAILS:     After review of chart, CM called Denise from [employer] [employer] [facility] ([phone]) to follow up on anticipated DC today and if that will be able to happen. CM requested a call back.     UPDATE 1020: CM called Denise from [employer] [facility] to discus DC today. Per Denise, all the medications were sent to [facility] pharmacy except for the Elilquis. CM called [facility] pharmacy and they confirmed that they have not received the Eliquis script. CM obtained fax to see if the script can be faxed instead. CM informed provider    1058: RN faxed paper script for Eliquis to [facility].    1105: CM called [facility] to confirm they received fax for Eliquis. They confimred they did and that they were working on filling script.     1110:PORTIA then called Denise from [facility] to provide update that Eliquis script was received at [facility] and they were working to fill it. Denise was going to follow up with supervisor on plan for pt to return to [facility] home today but said we could anticipate DC  around 6pm. [facility] will transport Pt. Provider and RN made aware

## 2025-03-19 NOTE — ASSESSMENT & PLAN NOTE
POA  Staff at [facility] states he developed this during a recent hospitalization/stay in nursing home for rehab  Being followed by wound care  Continue local care  Outpatient wound care referral placed at discharge

## 2025-03-19 NOTE — ASSESSMENT & PLAN NOTE
Presents with altered mental status, has hx Down syndrome and resides at nursing home  CT head- No acute intracranial abnormality. Chronic microangiopathic changes and moderate central greater than cortical volume loss  Infection workup including UA negative. Blood cultures negative  Previous provider spoke with nurse at nursing home. She states he has lived at the nursing home but more recently he was admitted to a nursing home for rehab after a hospitalization and it appears he developed an open sacral wound and had some decline during that time period. He is bedridden at baseline  He does eat a soft diet and takes his pills well at home  Avoid narcotics as patient sensitive to sedative effects of opioids  Resolved

## 2025-03-19 NOTE — ASSESSMENT & PLAN NOTE
A1C 7.6 with Dr. John 5/2020, has not been compliant with metformin 1000 mg daily.  No hypoglycemia, takes neurontin for neuropathy.   Discharged with daily potassium

## 2025-06-19 ENCOUNTER — APPOINTMENT (OUTPATIENT)
Dept: LAB | Facility: HOSPITAL | Age: 60
End: 2025-06-19
Attending: STUDENT IN AN ORGANIZED HEALTH CARE EDUCATION/TRAINING PROGRAM

## 2025-06-19 DIAGNOSIS — E27.40 ADRENAL INSUFFICIENCY (HCC): ICD-10-CM

## 2025-06-19 DIAGNOSIS — E53.8 BIOTIN-(PROPIONYL-COA-CARBOXYLASE) LIGASE DEFICIENCY: ICD-10-CM

## 2025-06-19 DIAGNOSIS — D64.9 ANEMIA, UNSPECIFIED TYPE: ICD-10-CM
